# Patient Record
Sex: FEMALE | Race: WHITE | NOT HISPANIC OR LATINO | Employment: UNEMPLOYED | URBAN - METROPOLITAN AREA
[De-identification: names, ages, dates, MRNs, and addresses within clinical notes are randomized per-mention and may not be internally consistent; named-entity substitution may affect disease eponyms.]

---

## 2018-11-12 ENCOUNTER — TELEPHONE (OUTPATIENT)
Dept: NEUROSURGERY | Facility: CLINIC | Age: 68
End: 2018-11-12

## 2018-11-12 NOTE — TELEPHONE ENCOUNTER
Pt daughter calling to book second opinion appt with Dr. Melo re: 4 brain masses; seen at [place] and was originally told mother was having a stroke; after several scans, told daughter it was tumors and edema on the brain and scheduled pt for biopsy this Wednesday; pt daughter seeking second opinion before moving forward.

## 2018-11-21 ENCOUNTER — INITIAL CONSULT (OUTPATIENT)
Dept: NEUROSURGERY | Facility: CLINIC | Age: 68
End: 2018-11-21
Payer: MEDICARE

## 2018-11-21 VITALS
TEMPERATURE: 97 F | HEART RATE: 64 BPM | WEIGHT: 156.94 LBS | RESPIRATION RATE: 16 BRPM | BODY MASS INDEX: 26.79 KG/M2 | DIASTOLIC BLOOD PRESSURE: 64 MMHG | HEIGHT: 64 IN | SYSTOLIC BLOOD PRESSURE: 139 MMHG

## 2018-11-21 DIAGNOSIS — C71.9 BRAIN TUMOR, GLIOMA: Primary | ICD-10-CM

## 2018-11-21 DIAGNOSIS — R56.9 SEIZURE: ICD-10-CM

## 2018-11-21 DIAGNOSIS — G93.6 VASOGENIC BRAIN EDEMA: ICD-10-CM

## 2018-11-21 DIAGNOSIS — R47.01 APHASIA: ICD-10-CM

## 2018-11-21 PROCEDURE — 99999 PR PBB SHADOW E&M-EST. PATIENT-LVL IV: CPT | Mod: PBBFAC,,, | Performed by: NEUROLOGICAL SURGERY

## 2018-11-21 PROCEDURE — 99205 OFFICE O/P NEW HI 60 MIN: CPT | Mod: S$GLB,,, | Performed by: NEUROLOGICAL SURGERY

## 2018-11-21 PROCEDURE — 99214 OFFICE O/P EST MOD 30 MIN: CPT | Mod: PBBFAC,PN | Performed by: NEUROLOGICAL SURGERY

## 2018-11-21 RX ORDER — VIT C/E/ZN/COPPR/LUTEIN/ZEAXAN 250MG-90MG
1000 CAPSULE ORAL
COMMUNITY
End: 2019-01-18

## 2018-11-21 RX ORDER — ASPIRIN 81 MG/1
81 TABLET ORAL
COMMUNITY
End: 2019-01-18

## 2018-11-21 RX ORDER — LISINOPRIL 20 MG/1
TABLET ORAL
Refills: 0 | COMMUNITY
Start: 2018-08-24 | End: 2018-11-21 | Stop reason: SDUPTHER

## 2018-11-21 RX ORDER — ATORVASTATIN CALCIUM 10 MG/1
10 TABLET, FILM COATED ORAL DAILY
Status: ON HOLD | COMMUNITY
Start: 2018-08-24 | End: 2019-04-10 | Stop reason: HOSPADM

## 2018-11-21 RX ORDER — ATORVASTATIN CALCIUM 10 MG/1
TABLET, FILM COATED ORAL
Refills: 0 | COMMUNITY
Start: 2018-08-24 | End: 2018-11-21 | Stop reason: SDUPTHER

## 2018-11-21 RX ORDER — DICLOFENAC SODIUM 10 MG/G
4 GEL TOPICAL
COMMUNITY
Start: 2018-05-07 | End: 2018-12-06 | Stop reason: SDUPTHER

## 2018-11-21 RX ORDER — LISINOPRIL 20 MG/1
20 TABLET ORAL 2 TIMES DAILY
COMMUNITY
Start: 2018-08-24 | End: 2018-11-22

## 2018-11-21 RX ORDER — LEVETIRACETAM 500 MG/1
TABLET ORAL
Refills: 0 | Status: ON HOLD | COMMUNITY
Start: 2018-11-08 | End: 2018-11-27 | Stop reason: HOSPADM

## 2018-11-21 RX ORDER — DEXAMETHASONE 2 MG/1
TABLET ORAL
Refills: 0 | COMMUNITY
Start: 2018-11-08 | End: 2018-11-21 | Stop reason: SDUPTHER

## 2018-11-21 RX ORDER — LEVETIRACETAM 500 MG/1
500 TABLET ORAL
COMMUNITY
Start: 2018-11-08 | End: 2018-11-21

## 2018-11-21 RX ORDER — PANTOPRAZOLE SODIUM 40 MG/1
40 TABLET, DELAYED RELEASE ORAL
Status: ON HOLD | COMMUNITY
Start: 2018-11-08 | End: 2018-11-27 | Stop reason: HOSPADM

## 2018-11-21 RX ORDER — DEXAMETHASONE 2 MG/1
2 TABLET ORAL
Status: ON HOLD | COMMUNITY
Start: 2018-11-08 | End: 2018-11-27 | Stop reason: HOSPADM

## 2018-11-21 RX ORDER — PANTOPRAZOLE SODIUM 40 MG/1
TABLET, DELAYED RELEASE ORAL
Refills: 0 | COMMUNITY
Start: 2018-11-08 | End: 2018-11-21 | Stop reason: SDUPTHER

## 2018-11-21 RX ORDER — MELATONIN 10 MG
10 CAPSULE ORAL
Status: ON HOLD | COMMUNITY
End: 2019-03-07

## 2018-11-21 NOTE — PROGRESS NOTES
Neurosurgery Outpatient Follow Up    Patient ID: Ashley Spencer is a 68 y.o. female.    Chief Complaint   Patient presents with    Brain Tumor     2nd opinion from [place] per daughter; 4 brain masses on left side of brain with edema           Review of Systems   Constitutional: Negative for activity change, appetite change, chills, fever and unexpected weight change.   HENT: Negative for tinnitus, trouble swallowing and voice change.    Respiratory: Negative for apnea, cough, chest tightness and shortness of breath.    Cardiovascular: Negative for chest pain and palpitations.   Gastrointestinal: Negative for constipation, diarrhea, nausea and vomiting.   Genitourinary: Negative for difficulty urinating, dysuria, frequency and urgency.   Musculoskeletal: Positive for gait problem. Negative for back pain, neck pain and neck stiffness.   Skin: Negative for wound.   Neurological: Positive for seizures, speech difficulty, weakness and headaches. Negative for dizziness, tremors, facial asymmetry, light-headedness and numbness.   Psychiatric/Behavioral: Positive for confusion and decreased concentration. The patient is nervous/anxious.        Past Medical History:   Diagnosis Date    Anemia     Hypercholesteremia     Hypertension      Social History     Socioeconomic History    Marital status:      Spouse name: Not on file    Number of children: Not on file    Years of education: Not on file    Highest education level: Not on file   Social Needs    Financial resource strain: Not on file    Food insecurity - worry: Not on file    Food insecurity - inability: Not on file    Transportation needs - medical: Not on file    Transportation needs - non-medical: Not on file   Occupational History    Not on file   Tobacco Use    Smoking status: Never Smoker    Smokeless tobacco: Never Used   Substance and Sexual Activity    Alcohol use: No     Frequency: Never    Drug use: No    Sexual activity: Not  "Currently   Other Topics Concern    Not on file   Social History Narrative    Not on file     History reviewed. No pertinent family history.  Review of patient's allergies indicates:   Allergen Reactions    Losartan-hydrochlorothiazide Anaphylaxis and Rash     Itching and flush       Current Outpatient Medications:     atorvastatin (LIPITOR) 10 MG tablet, Take 10 mg by mouth., Disp: , Rfl:     dexamethasone (DECADRON) 2 MG tablet, Take 2 mg by mouth., Disp: , Rfl:     diclofenac sodium (VOLTAREN) 1 % Gel, Apply 4 g topically., Disp: , Rfl:     levETIRAcetam (KEPPRA) 500 MG Tab, Take 500 mg by mouth., Disp: , Rfl:     lisinopril (PRINIVIL,ZESTRIL) 20 MG tablet, Take 20 mg by mouth., Disp: , Rfl:     melatonin 10 mg Cap, Take 10 mg by mouth., Disp: , Rfl:     pantoprazole (PROTONIX) 40 MG tablet, Take 40 mg by mouth., Disp: , Rfl:     aspirin (ECOTRIN) 81 MG EC tablet, Take 81 mg by mouth., Disp: , Rfl:     atorvastatin (LIPITOR) 10 MG tablet, , Disp: , Rfl: 0    cholecalciferol, vitamin D3, (VITAMIN D3) 1,000 unit capsule, Take 1,000 Units by mouth., Disp: , Rfl:     dexamethasone (DECADRON) 2 MG tablet, TK 1 T PO Q 12 H, Disp: , Rfl: 0    levETIRAcetam (KEPPRA) 500 MG Tab, TK 1 T PO BID, Disp: , Rfl: 0    lisinopril (PRINIVIL,ZESTRIL) 20 MG tablet, TK 1 T PO BID., Disp: , Rfl: 0    pantoprazole (PROTONIX) 40 MG tablet, TK 1 T PO QD, Disp: , Rfl: 0  Blood pressure 139/64, pulse 64, temperature 97.4 °F (36.3 °C), resp. rate 16, height 5' 4" (1.626 m), weight 71.2 kg (156 lb 15.5 oz).      Neurologic Exam     Mental Status   Oriented to person, place, and time.   Speech: speech is normal     Cranial Nerves     CN III, IV, VI   Pupils are equal, round, and reactive to light.  Extraocular motions are normal.     Gait, Coordination, and Reflexes     Reflexes   Right brachioradialis: 2+  Left brachioradialis: 2+  Right biceps: 2+  Left biceps: 2+  Right triceps: 2+  Left triceps: 2+  Right patellar: " 2+  Left patellar: 2+  Right achilles: 2+  Left achilles: 2+      Physical Exam   Constitutional: She is oriented to person, place, and time. She appears well-developed and well-nourished.   HENT:   Head: Normocephalic and atraumatic.   Eyes: EOM are normal. Pupils are equal, round, and reactive to light.   Neck: Normal range of motion. Neck supple.   Cardiovascular: Normal rate and intact distal pulses.   Pulmonary/Chest: Effort normal. No respiratory distress.   Abdominal: Soft. She exhibits no distension.   Musculoskeletal: Normal range of motion. She exhibits no edema or deformity.   Neurological: She is oriented to person, place, and time. She displays abnormal reflex. She displays no atrophy and no tremor. A sensory deficit is present. No cranial nerve deficit. She exhibits abnormal muscle tone. She displays no seizure activity. Coordination and gait abnormal. She displays Babinski's sign on the right side.   Reflex Scores:       Tricep reflexes are 2+ on the right side and 2+ on the left side.       Bicep reflexes are 2+ on the right side and 2+ on the left side.       Brachioradialis reflexes are 2+ on the right side and 2+ on the left side.       Patellar reflexes are 2+ on the right side and 2+ on the left side.       Achilles reflexes are 2+ on the right side and 2+ on the left side.  Receptive aphasia  Speech delay  Impaired tandem walk  + pronator drift  + right Babinski  Impaired station and gait       Skin: Skin is warm and dry.   Psychiatric: She has a normal mood and affect. Her speech is normal and behavior is normal. Judgment and thought content normal.   Nursing note and vitals reviewed.      Provider dictation:  The patient is a 68-year-old right-handed  female recently admitted to [facility] for stroke-like symptoms and found to have a large multifocal contrast-enhancing tumor in the left frontotemporal region. Biopsy was proposed by Dr Morfin but the patient chose to postpone  this and is seeking a second opinion. She has experienced functional improvement on high dose steroids and is free of seizures on Levetiracetam. The work-up at [facility] ruled out any primary cancer in the chest, abdomen or pelvis, and an infectious etiology was also excluded.    She reports a one year history of increasing headaches, malaise, and feeling off balance. She reports a sense of hearing loss on the right side. Her family suggests her language comprehension and congnitive function have markedly declined, and she is no longer able to write.     Since discharge from [facility] she has made a partial recovery but still has receptive aphasia, and some right spatial neglect with apraxia and ataxia.     I reviewed her brain MRI together with her family members. The scan demonstrates a multifocal contrast-enhancing tumor in the angular gyrus and parietal lobule Brodmann areas 39 and 40, and a separate satellite in the superior temporal gyrus in Brodmann area 22. The vasogenic edema and enhancement pattern suggest a high grade glial neoplasm.    I have advised her that the best treatment options include maximal debulking which would require awake craniotomy with language testing and brain mapping. This would accomplish cytoreduction, reduce ICP and obtain ample tissue for molecular testing and histopathology.    We will obtain a new contrast enhanced MRI, with MRS and DTI tractography for intraoperative white matter navigation.  We have discussed post-operative WBXRT, TMZ and Optune Novocure which she can undergo at the same time with TMZ or Avastin. I have advised her to continue Levetiracetam and Dexamethasone for life. We will direct admit her Friday morning for noon surgery.    Visit Diagnosis:  Brain tumor, glioma    Aphasia    Seizure    Vasogenic brain edema

## 2018-11-21 NOTE — LETTER
November 21, 2018      [address] [place] [address]           [facility] Neurosurgery  [address] [facility] [address]  [place] [address]  Phone: [phone]  Fax: [phone]          Patient: Ashley Spencer   MR Number: [id number]   YOB: 1950   Date of Visit: 11/21/2018       Dear [facility]:    Thank you for referring Ashley Spencer to me for evaluation. Attached you will find relevant portions of my assessment and plan of care.    If you have questions, please do not hesitate to call me. I look forward to following Ashley Spencer along with you.    Sincerely,    Prasanna Melo MD    Enclosure  CC:  No Recipients    If you would like to receive this communication electronically, please contact [email or web address] or [phone] to request more information on [employer] Link access.    For providers and/or their staff who would like to refer a patient to [facility], please contact us through our one-stop-shop provider referral line, [facility], at [phone].    If you feel you have received this communication in error or would no longer like to receive these types of communications, please e-mail [email or web address]

## 2018-11-23 PROBLEM — E78.5 HYPERLIPIDEMIA: Status: ACTIVE | Noted: 2018-11-23

## 2018-11-23 PROBLEM — D49.6 BRAIN TUMOR: Status: ACTIVE | Noted: 2018-11-23

## 2018-11-23 PROBLEM — Z79.52 CURRENT USE OF STEROID MEDICATION: Status: ACTIVE | Noted: 2018-11-23

## 2018-11-23 PROBLEM — I10 BENIGN ESSENTIAL HYPERTENSION: Status: ACTIVE | Noted: 2018-11-23

## 2018-11-23 PROBLEM — G93.6 VASOGENIC EDEMA: Status: ACTIVE | Noted: 2018-11-23

## 2018-11-23 PROBLEM — R47.01 RECEPTIVE APHASIA: Status: ACTIVE | Noted: 2018-11-23

## 2018-11-23 PROBLEM — K21.9 GASTROESOPHAGEAL REFLUX DISEASE WITHOUT ESOPHAGITIS: Status: ACTIVE | Noted: 2018-11-23

## 2018-11-23 PROBLEM — C71.9 GLIOMA: Status: ACTIVE | Noted: 2018-11-23

## 2018-11-26 PROBLEM — R79.9 ELEVATED BUN: Status: ACTIVE | Noted: 2018-11-26

## 2018-11-27 ENCOUNTER — TELEPHONE (OUTPATIENT)
Dept: NEUROSURGERY | Facility: CLINIC | Age: 68
End: 2018-11-27

## 2018-11-27 DIAGNOSIS — D49.6 BRAIN TUMOR: Primary | ICD-10-CM

## 2018-11-27 NOTE — TELEPHONE ENCOUNTER
----- Message from Irma Magallanes PA-C sent at 11/27/2018 10:09 AM CST -----  Please schedule this patient a 2 week wound check and a 4 weeks post-op follow-up with Dr. Melo with repeat MRI brain w/ and w/o DTI.   Patient is currently POD #4.     Thanks,    Tiff

## 2018-11-30 DIAGNOSIS — C71.9 GBM (GLIOBLASTOMA MULTIFORME): ICD-10-CM

## 2018-12-04 ENCOUNTER — OFFICE VISIT (OUTPATIENT)
Dept: HEMATOLOGY/ONCOLOGY | Facility: CLINIC | Age: 68
End: 2018-12-04
Payer: MEDICARE

## 2018-12-04 VITALS
HEIGHT: 63 IN | TEMPERATURE: 98 F | HEART RATE: 70 BPM | WEIGHT: 150.81 LBS | RESPIRATION RATE: 18 BRPM | DIASTOLIC BLOOD PRESSURE: 65 MMHG | SYSTOLIC BLOOD PRESSURE: 111 MMHG | BODY MASS INDEX: 26.72 KG/M2

## 2018-12-04 DIAGNOSIS — C71.9 GBM (GLIOBLASTOMA MULTIFORME): Primary | ICD-10-CM

## 2018-12-04 PROCEDURE — 99205 OFFICE O/P NEW HI 60 MIN: CPT | Mod: S$GLB,,, | Performed by: INTERNAL MEDICINE

## 2018-12-04 PROCEDURE — 99999 PR PBB SHADOW E&M-EST. PATIENT-LVL III: CPT | Mod: PBBFAC,,, | Performed by: INTERNAL MEDICINE

## 2018-12-04 PROCEDURE — 1100F PTFALLS ASSESS-DOCD GE2>/YR: CPT | Mod: CPTII,S$GLB,, | Performed by: INTERNAL MEDICINE

## 2018-12-04 PROCEDURE — 3288F FALL RISK ASSESSMENT DOCD: CPT | Mod: CPTII,S$GLB,, | Performed by: INTERNAL MEDICINE

## 2018-12-04 RX ORDER — DICLOFENAC SODIUM 10 MG/G
4 GEL TOPICAL
Status: CANCELLED | OUTPATIENT
Start: 2018-12-04

## 2018-12-04 NOTE — LETTER
December 4, 2018        Prasanna Melo MD  [address] [facility] [address]  [address]  [facility] [address]             [facility] [place] [address] [facility] [address]  [facility] [address]  Phone: [phone]  Fax: [phone]   Patient: Ashley Spencer   MR Number: [id number]   YOB: 1950   Date of Visit: 12/4/2018       Dear Dr. Melo:    Thank you for referring Ashley Spencer to me for evaluation of incompletely GBM. Below are the relevant portions of my assessment and plan of care.  If you have questions, please do not hesitate to call me. I look forward to following Ashley along with you.    Sincerely,      MD PENNIE Escalante MD

## 2018-12-04 NOTE — PROGRESS NOTES
CONSULT NOTE.    CHIEF COMPLAINT:  Glioblastoma multiforme.    HISTORY OF PRESENT ILLNESS:  A 68-year-old white female who suffers from   hypertension and hyperlipidemia who began to experience aphasia and right upper   extremity weakness, which she thought was indicative of a stroke.  She was   initially seen in [facility] and then eventually transferred to   [facility].  The patient was discovered to have CNS mass lesion   on imaging and has undergone craniotomy with Dr. Melo.  Pathology was   remarkable for glioblastoma multiforme.  The tumor could not be completely   resected due to progressive neurologic deficits encountered during the   craniotomy.  The patient is approximately two weeks postop.  She has experienced   visual loss and has continued weakness of the right upper extremity.  Her   aphasia has improved somewhat.  She is eating well, but has poor oral intake of   liquids.  She spends little time up out of bed and is ambulating with the   assistance of a wheelchair at the time of today's visit.  No other complaints or   pertinent findings on a 14-point review of systems.    PAST MEDICAL HISTORY:  1.  Hypertension.  2.  Hyperlipidemia.  3.  DJD.    ALLERGIES:  Losartan/hydrochlorothiazide.    MEDICATIONS:  1.  Ecotrin 81 mg daily.  2.  Lipitor 10 mg nightly.  3.  Decadron on a taper as per Neurosurgery.  4.  Voltaren gel 4 g topically as needed.  5.  Hydrocodone 7.5/325 every 4 hours as needed for pain.  6.  Keppra 750 mg twice daily for prevention of seizures.  7.  Zestril 20 mg twice daily.  8.  Protonix 40 mg daily.  9.  Vitamin D 1000 units daily.  10.  Melatonin 10 mg every evening.    FAMILY AND SOCIAL HISTORY:  No ongoing tobacco or alcohol abuse.  The patient is   cared for by her daughter who is an Emergency Room nurse and her .    PHYSICAL EXAMINATION:  GENERAL:  Well-developed, elderly, frail-appearing, white female confined to a   wheelchair.  VITAL  SIGNS:  Weight of 150-1/2 pounds.  HEENT:  Normocephalic, post craniotomy scar, healing and free from signs of   local infection.  Oral mucosa pink and moist.  Lips without lesions.  Tongue   midline.  Oropharynx clear.  Nonicteric sclerae.   NECK:  Supple, no adenopathy.  No carotid bruits, thyromegaly or thyroid nodule.                                                                        HEART:  Regular rate and rhythm without murmur, gallop or rub.                LUNGS:  Clear to auscultation bilaterally.  Normal respiratory effort.       ABDOMEN:  Soft, nontender, nondistended with positive normoactive bowel sounds,   no hepatosplenomegaly.    EXTREMITIES:  No cyanosis, clubbing or edema.  Distal pulses are intact.                                              AXILLAE AND GROIN:  No palpable pathologic lymphadenopathy is appreciated.        SKIN:  Intact/turgor normal                                                                LABORATORY:  CBC reveals a white count of 9.7, H and H 11 and 32.8, and platelet   count of 185.  Sodium 136, potassium 4.9, chloride 106, CO2 of 23, BUN 32,   creatinine 0.7, glucose 122, calcium 8.4.  GFR is greater than 60.    IMPRESSION:  1.  Incompletely resected glioblastoma multiforme.  2.  Residual right upper extremity weakness, aphasia, and visual disturbance.  3.  Hypertension.  4.  Hyperlipidemia.    PLAN:   1.  Consult Dr. Prince in Radiation Oncology for assistance with postoperative   adjuvant XRT.  2.  Plan to administer radiosensitizing doses of Temodar during radiation.    Specifically, this will consist of Temodar at a dose of 75 mg/m2 per day (140   mg) p.o. daily 30 to 60 minutes before each radiation treatment.  3.  The patient will receive Sancuso patch each week during XRT for   prevention of therapy-associated nausea and emesis.  4.  The patient is to return to clinic at earliest convenience to initiate   combined modality therapy and will be seen by myself  one week following   initiation with interval CBC, CMP, LDH, and magnesium prior to week 2 of   treatment.  5.  Forty minutes of contact time spent counseling concerning diagnosis,   rationale for postoperative therapy, antineoplastics/supportive care meds to be   employed in therapy aspects of their administration, potential side effects,   interventions for these, etc.  The patient and family voiced understanding and   wish to proceed as above.      KINGSLEY/HN  dd: 12/04/2018 12:22:55 (CST)  td: 12/05/2018 04:48:14 (CST)  Doc ID   #[id number]  Job ID #[id number]    CC:

## 2018-12-06 ENCOUNTER — CLINICAL SUPPORT (OUTPATIENT)
Dept: NEUROSURGERY | Facility: CLINIC | Age: 68
End: 2018-12-06
Payer: MEDICARE

## 2018-12-06 ENCOUNTER — HOSPITAL ENCOUNTER (OUTPATIENT)
Dept: RADIOLOGY | Facility: HOSPITAL | Age: 68
Discharge: HOME | End: 2018-12-06
Attending: PHYSICIAN ASSISTANT
Payer: MEDICARE

## 2018-12-06 DIAGNOSIS — C71.9 GBM (GLIOBLASTOMA MULTIFORME): ICD-10-CM

## 2018-12-06 DIAGNOSIS — C71.9 GLIOMA: Primary | ICD-10-CM

## 2018-12-06 DIAGNOSIS — C71.9 GBM (GLIOBLASTOMA MULTIFORME): Primary | ICD-10-CM

## 2018-12-06 PROCEDURE — 70450 CT HEAD/BRAIN W/O DYE: CPT | Mod: 26,,, | Performed by: RADIOLOGY

## 2018-12-06 PROCEDURE — 70450 CT HEAD/BRAIN W/O DYE: CPT | Mod: TC,PO

## 2018-12-06 RX ORDER — DICLOFENAC SODIUM 10 MG/G
4 GEL TOPICAL DAILY
Qty: 100 G | Refills: 1 | Status: SHIPPED | OUTPATIENT
Start: 2018-12-06

## 2018-12-06 RX ORDER — DEXAMETHASONE 2 MG/1
2 TABLET ORAL EVERY 6 HOURS
Qty: 40 TABLET | Refills: 0 | Status: SHIPPED | OUTPATIENT
Start: 2018-12-06 | End: 2018-12-10 | Stop reason: ALTCHOICE

## 2018-12-06 NOTE — PROGRESS NOTES
Pt is 14 days s/p crani for tumor resection with Dr. Melo. No s/s of infection. Incision cleaned with chloraprep and staples removed with no issue. Incision is warm, dry, intact. Pt reports post-operative pain level < pre-operative state. Pt is not requesting medication refill today. Pt re-educated on narcotics policy. Educated patient on weight lifting status, bending/lifting/twisting, and to call with any changes or questions. Pt aware of imaging and f/u appt with provider. Pt family c/o increased right sided weakness and mild confusion; family reports some dehydration but have been trying to force fluids. TAVIA Gaona notified to assess pt and answer additional family questions.

## 2018-12-07 ENCOUNTER — TELEPHONE (OUTPATIENT)
Dept: NEUROSURGERY | Facility: CLINIC | Age: 68
End: 2018-12-07

## 2018-12-07 DIAGNOSIS — R29.818 NEUROLOGICAL DEFICIT PRESENT: ICD-10-CM

## 2018-12-07 DIAGNOSIS — D49.89 NEOPLASM OF HEAD: ICD-10-CM

## 2018-12-07 NOTE — TELEPHONE ENCOUNTER
----- Message from Candelaria Ratliff PA-C sent at 12/7/2018 12:30 PM CST -----  Please get patient in today for STAT MRI Brain-I ordered. I discussed with Dr. Melo. He wants to check for disease recurrance prior to radiation to see if she would need another resection. Her radiation appt is Monday so needs to be done today so we can decide next step.    Thank you  -K

## 2018-12-20 ENCOUNTER — HOSPITAL ENCOUNTER (OUTPATIENT)
Dept: RADIOLOGY | Facility: HOSPITAL | Age: 68
Discharge: HOME | End: 2018-12-20
Attending: NEUROLOGICAL SURGERY
Payer: MEDICARE

## 2018-12-20 ENCOUNTER — OFFICE VISIT (OUTPATIENT)
Dept: NEUROSURGERY | Facility: CLINIC | Age: 68
End: 2018-12-20
Payer: MEDICARE

## 2018-12-20 VITALS
DIASTOLIC BLOOD PRESSURE: 66 MMHG | BODY MASS INDEX: 25.61 KG/M2 | HEIGHT: 64 IN | WEIGHT: 150 LBS | SYSTOLIC BLOOD PRESSURE: 106 MMHG | TEMPERATURE: 98 F | RESPIRATION RATE: 16 BRPM | HEART RATE: 88 BPM

## 2018-12-20 DIAGNOSIS — C71.9 GLIOBLASTOMA DETERMINED BY BIOPSY OF BRAIN: Primary | ICD-10-CM

## 2018-12-20 DIAGNOSIS — D49.6 BRAIN TUMOR: ICD-10-CM

## 2018-12-20 PROCEDURE — A9585 GADOBUTROL INJECTION: HCPCS | Mod: PO | Performed by: NEUROLOGICAL SURGERY

## 2018-12-20 PROCEDURE — 70553 MRI BRAIN STEM W/O & W/DYE: CPT | Mod: TC,PO

## 2018-12-20 PROCEDURE — 25500020 PHARM REV CODE 255: Mod: PO | Performed by: NEUROLOGICAL SURGERY

## 2018-12-20 PROCEDURE — 70553 MRI BRAIN STEM W/O & W/DYE: CPT | Mod: 26,,, | Performed by: RADIOLOGY

## 2018-12-20 PROCEDURE — 99024 POSTOP FOLLOW-UP VISIT: CPT | Mod: S$GLB,,, | Performed by: NEUROLOGICAL SURGERY

## 2018-12-20 PROCEDURE — 99999 PR PBB SHADOW E&M-EST. PATIENT-LVL III: CPT | Mod: PBBFAC,,, | Performed by: NEUROLOGICAL SURGERY

## 2018-12-20 RX ORDER — GADOBUTROL 604.72 MG/ML
6 INJECTION INTRAVENOUS
Status: COMPLETED | OUTPATIENT
Start: 2018-12-20 | End: 2018-12-20

## 2018-12-20 RX ADMIN — GADOBUTROL 6 ML: 604.72 INJECTION INTRAVENOUS at 01:12

## 2018-12-21 ENCOUNTER — TELEPHONE (OUTPATIENT)
Dept: HEMATOLOGY/ONCOLOGY | Facility: CLINIC | Age: 68
End: 2018-12-21

## 2018-12-21 ENCOUNTER — TELEPHONE (OUTPATIENT)
Dept: PHARMACY | Facility: AMBULARY SURGERY CENTER | Age: 68
End: 2018-12-21

## 2018-12-21 ENCOUNTER — DOCUMENTATION ONLY (OUTPATIENT)
Dept: HEMATOLOGY/ONCOLOGY | Facility: CLINIC | Age: 68
End: 2018-12-21

## 2018-12-21 ENCOUNTER — TELEPHONE (OUTPATIENT)
Dept: PHARMACY | Facility: CLINIC | Age: 68
End: 2018-12-21

## 2018-12-21 NOTE — TELEPHONE ENCOUNTER
DOCUMENTATION ONLY:  Prior authorization for Temodar (temozolomide) approved from 12/20/2018 to 02/29/2020  Case ID: [id number]    Co-pay: $848.46    Patient Assistance IS  Required. Sending to the financial assistance team to investigate assistance options for the Temodar. Milena CRAIG

## 2018-12-21 NOTE — TELEPHONE ENCOUNTER
FOR DOCUMENTATION ONLY:  Financial Assistance for [employer] is approved from 12-21-18 to 12-31-19  Source [facility] Patient Assistance  Sending a staff message to Dr Denney

## 2018-12-21 NOTE — TELEPHONE ENCOUNTER
----- Message from Susu Moses sent at 12/21/2018  1:32 PM CST -----  Contact: Susu Moses, Pharmacy Patient Assistance  Hello,    I have been working on finding assistance for Sancuso medication for pt. Unfortunately, there isn't any funding or programs available. Is Dr. Denney able to provide additional samples or change to alternate? I tried to reach the pt. and was unsuccessful. Please advise.      Thanks,    Susu ext. [phone]

## 2018-12-21 NOTE — TELEPHONE ENCOUNTER
Temodar is approved by the patient's insurance with a high co pay. We will be assisting her in applying to [employer] patient assistance. Faxing assistance application prescription to Dr Denney for his review and signature.

## 2018-12-21 NOTE — PROGRESS NOTES
Spoke with daughter, Maria Guadalupe, patient should receive medication tomorrow, but no later than 12/24.  Spoke with Arleen in [facility] and informed her of the same.  All paperwork forwarded to Elle at [facility].

## 2018-12-30 NOTE — PROGRESS NOTES
Neurosurgery Outpatient Follow Up    Patient ID: Ashley Spencer is a 68 y.o. female.    Chief Complaint   Patient presents with    Post-op Evaluation     PO brain surgery; tumor removal           Review of Systems   Constitutional: Negative for activity change, appetite change, chills, fever and unexpected weight change.   HENT: Negative for tinnitus, trouble swallowing and voice change.    Respiratory: Negative for apnea, cough, chest tightness and shortness of breath.    Cardiovascular: Negative for chest pain and palpitations.   Gastrointestinal: Negative for constipation, diarrhea, nausea and vomiting.   Genitourinary: Negative for difficulty urinating, dysuria, frequency and urgency.   Musculoskeletal: Positive for gait problem. Negative for back pain, neck pain and neck stiffness.   Skin: Negative for wound.   Neurological: Positive for seizures, speech difficulty, weakness and headaches. Negative for dizziness, tremors, facial asymmetry, light-headedness and numbness.   Psychiatric/Behavioral: Positive for confusion and decreased concentration. The patient is nervous/anxious.        Past Medical History:   Diagnosis Date    Anemia     Cancer     Brain cancer    Heartburn     Hypercholesteremia     Hypertension     MVP (mitral valve prolapse)      Social History     Socioeconomic History    Marital status:      Spouse name: Not on file    Number of children: Not on file    Years of education: Not on file    Highest education level: Not on file   Social Needs    Financial resource strain: Not on file    Food insecurity - worry: Not on file    Food insecurity - inability: Not on file    Transportation needs - medical: Not on file    Transportation needs - non-medical: Not on file   Occupational History    Not on file   Tobacco Use    Smoking status: Never Smoker    Smokeless tobacco: Never Used   Substance and Sexual Activity    Alcohol use: No     Frequency: Never    Drug use: No     Sexual activity: Not Currently   Other Topics Concern    Not on file   Social History Narrative    Not on file     Family History   Problem Relation Age of Onset    Cancer Mother     COPD Father     Breast cancer Sister     Cancer Paternal Aunt      Review of patient's allergies indicates:   Allergen Reactions    Losartan-hydrochlorothiazide Anaphylaxis and Rash     Itching and flush       Current Outpatient Medications:     atorvastatin (LIPITOR) 10 MG tablet, Take 10 mg by mouth., Disp: , Rfl:     dexamethasone (DECADRON) 2 MG tablet, Dexamethasone Taper:Take 4mg (2 tablets) by mouth every 8 hours for 3 days, then take 4mg (2 tablets) by mouth every 12 hours for 3 days, then take 2mg ( 1 tablet) by mouth every 8 hours for 3 days, then take 2mg (1 tablet) by mouth every 12 hours indefinitely, Disp: 90 tablet, Rfl: 11    diclofenac sodium (VOLTAREN) 1 % Gel, Apply 4 g topically once daily. Apply to knee 1x daily as needed, Disp: 100 g, Rfl: 1    granisetron (SANCUSO) 3.1 mg/24 hour, Place 1 patch onto the skin on Sunday prior to each week of x-ray therapy., Disp: 5 patch, Rfl: 1    HYDROcodone-acetaminophen (NORCO) 7.5-325 mg per tablet, Take 1 tablet by mouth every 4 (four) hours as needed for Pain., Disp: 45 tablet, Rfl: 0    levETIRAcetam (KEPPRA) 750 MG Tab, Take 1 tablet (750 mg total) by mouth 2 (two) times daily., Disp: 60 tablet, Rfl: 2    lisinopril (PRINIVIL,ZESTRIL) 20 MG tablet, Take 20 mg by mouth 2 (two) times daily., Disp: , Rfl:     pantoprazole (PROTONIX) 40 MG tablet, Take 1 tablet (40 mg total) by mouth once daily., Disp: 30 tablet, Rfl: 11    temozolomide (TEMODAR) 140 MG capsule, Take 1 capsule (140 mg total) by mouth once daily during radiation., Disp: 42 capsule, Rfl: 0    aspirin (ECOTRIN) 81 MG EC tablet, Take 81 mg by mouth., Disp: , Rfl:     cholecalciferol, vitamin D3, (VITAMIN D3) 1,000 unit capsule, Take 1,000 Units by mouth., Disp: , Rfl:     melatonin 10 mg Cap,  "Take 10 mg by mouth., Disp: , Rfl:   No current facility-administered medications for this visit.     Facility-Administered Medications Ordered in Other Visits:     aluminum-magnesium hydroxide-simethicone 200-200-20 mg/5 mL suspension 30 mL, 30 mL, Oral, Q4H PRN, Candelaria Ratliff PA-C    dextrose 50% injection 12.5 g, 12.5 g, Intravenous, PRN, TAVIA Gonzalez-GABO    dextrose 50% injection 25 g, 25 g, Intravenous, PRN, TAVIA Gonzalez-GABO    glucagon (human recombinant) injection 1 mg, 1 mg, Intramuscular, PRN, Candelaria Ratliff PA-C    glucose chewable tablet 16 g, 16 g, Oral, PRN, TAVIA Gonzalez-GABO    glucose chewable tablet 24 g, 24 g, Oral, PRN, Candelaria Ratliff PA-C    hydrALAZINE injection 20 mg, 20 mg, Intravenous, Q2H PRN, Candelaria Ratliff PA-C    insulin aspart U-100 pen 1-10 Units, 1-10 Units, Subcutaneous, Q6H PRN, TAVIA Gonzalez-C, 2 Units at 11/26/18 1821    labetalol 20 mg/4 mL (5 mg/mL) IV syring, 10 mg, Intravenous, Q1H PRN, Candelaria Ratliff PA-C, 15 mg at 11/23/18 1600    levETIRAcetam (KEPPRA) 750 mg in dextrose 5 % 100 mL IVPB, 750 mg, Intravenous, Q12H, Candelaria Ratliff PA-C, Last Rate: 200 mL/hr at 11/27/18 0830, 750 mg at 11/27/18 0830    ondansetron injection 4 mg, 4 mg, Intravenous, Q6H PRN, Candelaria Ratliff PA-C    pantoprazole injection 40 mg, 40 mg, Intravenous, Daily, Candelaria Ratliff PA-C, 40 mg at 11/27/18 0829    senna-docusate 8.6-50 mg per tablet 2 tablet, 2 tablet, Oral, Nightly PRN, Candelaria Ratliff PA-C, 2 tablet at 11/25/18 1651  Blood pressure 106/66, pulse 88, temperature 97.8 °F (36.6 °C), resp. rate 16, height 5' 4" (1.626 m), weight 68.1 kg (150 lb 0.4 oz).      Neurologic Exam     Mental Status   Oriented to person, place, and time.   Speech: speech is normal     Cranial Nerves     CN III, IV, VI   Pupils are equal, round, and reactive to light.  Extraocular motions are normal. "     Gait, Coordination, and Reflexes     Reflexes   Right brachioradialis: 2+  Left brachioradialis: 2+  Right biceps: 2+  Left biceps: 2+  Right triceps: 2+  Left triceps: 2+  Right patellar: 2+  Left patellar: 2+  Right achilles: 2+  Left achilles: 2+      Physical Exam   Constitutional: She is oriented to person, place, and time. She appears well-developed and well-nourished.   HENT:   Head: Normocephalic and atraumatic.   Eyes: EOM are normal. Pupils are equal, round, and reactive to light.   Neck: Normal range of motion. Neck supple.   Cardiovascular: Normal rate and intact distal pulses.   Pulmonary/Chest: Effort normal. No respiratory distress.   Abdominal: Soft. She exhibits no distension.   Musculoskeletal: Normal range of motion. She exhibits no edema or deformity.   Neurological: She is oriented to person, place, and time. She displays abnormal reflex. She displays no atrophy and no tremor. A sensory deficit is present. No cranial nerve deficit. She exhibits abnormal muscle tone. She displays no seizure activity. Coordination and gait abnormal. She displays Babinski's sign on the right side.   Reflex Scores:       Tricep reflexes are 2+ on the right side and 2+ on the left side.       Bicep reflexes are 2+ on the right side and 2+ on the left side.       Brachioradialis reflexes are 2+ on the right side and 2+ on the left side.       Patellar reflexes are 2+ on the right side and 2+ on the left side.       Achilles reflexes are 2+ on the right side and 2+ on the left side.  Receptive aphasia  Speech delay  Impaired tandem walk  + pronator drift  + right Babinski  Impaired station and gait       Skin: Skin is warm and dry.   Psychiatric: She has a normal mood and affect. Her speech is normal and behavior is normal. Judgment and thought content normal.   Nursing note and vitals reviewed.      Provider dictation:  The patient is a 68-year-old right-handed  female 4 weeks s/p partial resection of a  large multifocal contrast-enhancing tumor in the left frontotemporal region. 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  She has experienced functional improvement on high dose steroids and is free of seizures on Levetiracetam.      Her family suggests her language comprehension and congnitive function have stablized and she is abnle to communicate fairly well.     On exam she has some right spatial neglect with apraxia and ataxia. She has new visual loss from last exam.    I reviewed her new brain MRI together with her family members. The scan demonstrates appearance of new tumors in the left tapetum, parietoocipital junction. There is contrast-enhancement in the resection  Beds and reduced edema at the site of the previous lesions.     I have advised her that we have achieved some cytoreduction, and edema reduction thus decreasing ICP but the aggressive de dalia formation of a new tumor in just 3 weeks is very concerning.    We will obtain a new contrast enhanced MRI, with MRS and DTI tractography in 3 months.  We have advised her to see oncology soon and she is cleared to pursue post-operative WBXRT, TMZ and Optune Novocure which she can undergo at the same time with TMZ or Avastin. I have advised her to continue Levetiracetam and low dose Dexamethasone for life.     Visit Diagnosis:  Glioblastoma determined by biopsy of brain

## 2019-01-09 ENCOUNTER — LAB VISIT (OUTPATIENT)
Dept: LAB | Facility: HOSPITAL | Age: 69
End: 2019-01-09
Attending: INTERNAL MEDICINE
Payer: MEDICARE

## 2019-01-09 ENCOUNTER — OFFICE VISIT (OUTPATIENT)
Dept: HEMATOLOGY/ONCOLOGY | Facility: CLINIC | Age: 69
End: 2019-01-09
Payer: MEDICARE

## 2019-01-09 VITALS
BODY MASS INDEX: 21.83 KG/M2 | WEIGHT: 135.81 LBS | HEIGHT: 66 IN | SYSTOLIC BLOOD PRESSURE: 114 MMHG | RESPIRATION RATE: 16 BRPM | HEART RATE: 93 BPM | TEMPERATURE: 99 F | DIASTOLIC BLOOD PRESSURE: 80 MMHG

## 2019-01-09 DIAGNOSIS — D64.9 NORMOCYTIC ANEMIA: ICD-10-CM

## 2019-01-09 DIAGNOSIS — T45.1X5A CINV (CHEMOTHERAPY-INDUCED NAUSEA AND VOMITING): ICD-10-CM

## 2019-01-09 DIAGNOSIS — C71.9 GBM (GLIOBLASTOMA MULTIFORME): Primary | ICD-10-CM

## 2019-01-09 DIAGNOSIS — C71.9 GBM (GLIOBLASTOMA MULTIFORME): ICD-10-CM

## 2019-01-09 DIAGNOSIS — R11.2 CINV (CHEMOTHERAPY-INDUCED NAUSEA AND VOMITING): ICD-10-CM

## 2019-01-09 LAB
ALBUMIN SERPL BCP-MCNC: 3.9 G/DL
ALP SERPL-CCNC: 54 U/L
ALT SERPL W/O P-5'-P-CCNC: 57 U/L
ANION GAP SERPL CALC-SCNC: 10 MMOL/L
AST SERPL-CCNC: 30 U/L
BASOPHILS NFR BLD: 0 %
BILIRUB SERPL-MCNC: 0.7 MG/DL
BUN SERPL-MCNC: 29 MG/DL
CALCIUM SERPL-MCNC: 9.1 MG/DL
CHLORIDE SERPL-SCNC: 105 MMOL/L
CO2 SERPL-SCNC: 24 MMOL/L
CREAT SERPL-MCNC: 0.74 MG/DL
DIFFERENTIAL METHOD: ABNORMAL
EOSINOPHIL NFR BLD: 0 %
ERYTHROCYTE [DISTWIDTH] IN BLOOD BY AUTOMATED COUNT: 15.5 %
EST. GFR  (AFRICAN AMERICAN): >60 ML/MIN/1.73 M^2
EST. GFR  (NON AFRICAN AMERICAN): >60 ML/MIN/1.73 M^2
GIANT PLATELETS BLD QL SMEAR: ABNORMAL
GLUCOSE SERPL-MCNC: 159 MG/DL
HCT VFR BLD AUTO: 36.6 %
HGB BLD-MCNC: 11.9 G/DL
LDH SERPL L TO P-CCNC: 932 U/L
LYMPHOCYTES NFR BLD: 12 %
MAGNESIUM SERPL-MCNC: 2 MG/DL
MCH RBC QN AUTO: 31.2 PG
MCHC RBC AUTO-ENTMCNC: 32.5 G/DL
MCV RBC AUTO: 96 FL
MONOCYTES NFR BLD: 6 %
MYELOCYTES NFR BLD MANUAL: 2 %
NEUTROPHILS # BLD AUTO: 5.7 K/UL
NEUTROPHILS NFR BLD: 78 %
NEUTS BAND NFR BLD MANUAL: 2 %
NRBC BLD-RTO: 0 /100 WBC
PLATELET # BLD AUTO: 137 K/UL
PMV BLD AUTO: 9.9 FL
POTASSIUM SERPL-SCNC: 4.6 MMOL/L
PROT SERPL-MCNC: 6.9 G/DL
RBC # BLD AUTO: 3.82 M/UL
SODIUM SERPL-SCNC: 139 MMOL/L
WBC # BLD AUTO: 7.11 K/UL

## 2019-01-09 PROCEDURE — 85027 COMPLETE CBC AUTOMATED: CPT

## 2019-01-09 PROCEDURE — 99499 RISK ADDL DX/OHS AUDIT: ICD-10-PCS | Mod: S$GLB,,, | Performed by: INTERNAL MEDICINE

## 2019-01-09 PROCEDURE — 83735 ASSAY OF MAGNESIUM: CPT | Mod: PN

## 2019-01-09 PROCEDURE — 85027 COMPLETE CBC AUTOMATED: CPT | Mod: PN

## 2019-01-09 PROCEDURE — 1101F PT FALLS ASSESS-DOCD LE1/YR: CPT | Mod: CPTII,S$GLB,, | Performed by: INTERNAL MEDICINE

## 2019-01-09 PROCEDURE — 85007 BL SMEAR W/DIFF WBC COUNT: CPT | Mod: PN

## 2019-01-09 PROCEDURE — 1101F PR PT FALLS ASSESS DOC 0-1 FALLS W/OUT INJ PAST YR: ICD-10-PCS | Mod: CPTII,S$GLB,, | Performed by: INTERNAL MEDICINE

## 2019-01-09 PROCEDURE — 99499 UNLISTED E&M SERVICE: CPT | Mod: S$GLB,,, | Performed by: INTERNAL MEDICINE

## 2019-01-09 PROCEDURE — 36415 COLL VENOUS BLD VENIPUNCTURE: CPT | Mod: PN

## 2019-01-09 PROCEDURE — 99999 PR PBB SHADOW E&M-EST. PATIENT-LVL III: ICD-10-PCS | Mod: PBBFAC,,, | Performed by: INTERNAL MEDICINE

## 2019-01-09 PROCEDURE — 80053 COMPREHEN METABOLIC PANEL: CPT | Mod: PN

## 2019-01-09 PROCEDURE — 83615 LACTATE (LD) (LDH) ENZYME: CPT | Mod: PN

## 2019-01-09 PROCEDURE — 99999 PR PBB SHADOW E&M-EST. PATIENT-LVL III: CPT | Mod: PBBFAC,,, | Performed by: INTERNAL MEDICINE

## 2019-01-09 PROCEDURE — 80053 COMPREHEN METABOLIC PANEL: CPT

## 2019-01-09 PROCEDURE — 99214 OFFICE O/P EST MOD 30 MIN: CPT | Mod: S$GLB,,, | Performed by: INTERNAL MEDICINE

## 2019-01-09 PROCEDURE — 83615 LACTATE (LD) (LDH) ENZYME: CPT

## 2019-01-09 PROCEDURE — 85007 BL SMEAR W/DIFF WBC COUNT: CPT

## 2019-01-09 PROCEDURE — 99214 PR OFFICE/OUTPT VISIT, EST, LEVL IV, 30-39 MIN: ICD-10-PCS | Mod: S$GLB,,, | Performed by: INTERNAL MEDICINE

## 2019-01-09 PROCEDURE — 83735 ASSAY OF MAGNESIUM: CPT

## 2019-01-09 RX ORDER — PROCHLORPERAZINE MALEATE 10 MG
10 TABLET ORAL EVERY 6 HOURS PRN
Qty: 60 TABLET | Refills: 6 | Status: SHIPPED | OUTPATIENT
Start: 2019-01-09 | End: 2020-01-09

## 2019-01-09 NOTE — PROGRESS NOTES
HISTORY OF PRESENT ILLNESS:  The patient is a 68-year-old white female known to   me for incompletely resected glioblastoma multiforme.  She is receiving external   beam radiation therapy and radiosensitizing doses of Temodar.  She returns to   clinic for evaluation prior to next week's therapy as I will be out on Friday.    She has had one episode of CINV.  This occurred despite Sancuso patch.  She did   not have other medication on hand to quell nausea.  The patient is generally   weak, sleeps much of the day, and continues to have left lower extremity and   right upper extremity weakness and visual field defect.  No other complaints or   pertinent findings on a 14-point review of systems.    PHYSICAL EXAMINATION:  GENERAL:  Well-developed, obese white female in no acute distress.  VITAL SIGNS:  Weight 135.5 pounds (decreased by 15 pounds).  HEENT:  Normocephalic, atraumatic.  Oral mucosa pink and moist.  Lips without   lesions.  Tongue midline.  Oropharynx clear.  Nonicteric sclerae.   NECK:  Supple, no adenopathy.  No carotid bruits, thyromegaly or thyroid nodule.                                                                        HEART:  Regular rate and rhythm without murmur, gallop or rub.                LUNGS:  Clear to auscultation bilaterally.  Normal respiratory effort.       ABDOMEN:  Soft, nontender, nondistended with positive normoactive bowel sounds,   no hepatosplenomegaly.    EXTREMITIES:  No cyanosis, clubbing or edema.  Distal pulses are intact.                                              AXILLAE AND GROIN:  No palpable pathologic lymphadenopathy is appreciated.        SKIN:  Intact/turgor normal                                                              NEUROLOGIC:  The patient's right upper extremity remains in a sling and she   continues to ambulate with the assistance of a wheelchair.    LABORATORY:  White count 7.1, H and H 11.9 and 36.6, platelets 137.  Sodium 139,   potassium 4.6,  chloride 105, CO2 24, BUN 29, creatinine 0.7, glucose 159,   calcium 9.1, ALT 57.  Remainder of the liver function tests within normal   limits.  .  GFR is greater than 60.    IMPRESSION:  1.  Incompletely resected glioblastoma multiforme.  2.  Residual right upper extremity weakness/visual disturbance, gait impairment.  3.  Hypertension.  4.  Hyperlipidemia.  5.  Weight loss.    PLAN:  1.  Continue XRT per Dr. Prince.  2.  The patient is cleared to proceed with next week's Temodar.  3.  The patient has been provided prescription for Compazine for as needed use   if she has breakthrough nausea and emesis.  Otherwise, she is to continue   Sancuso as previously prescribed.  4.  Return to clinic one week from Friday with interval CBC, BMP, and magnesium.      KINGSLEY/HN  dd: 01/09/2019 10:33:49 (CST)  td: 01/10/2019 06:22:45 (CST)  Doc ID   #[id number]  Job ID #[id number]    CC:

## 2019-01-10 NOTE — PROGRESS NOTES
Patient, Ashley Spencer (MRN #[id number]), presented with a recent Platelet count less than 150 K/uL consistent with the definition of thrombocytopenia (ICD10 - D69.6).    Platelets   Date Value Ref Range Status   01/09/2019 137 (L) 150 - 350 K/uL Final     The patient's thrombocytopenia was monitored, evaluated, addressed and/or treated. This addendum to the medical record is made on 01/09/2019.

## 2019-01-11 ENCOUNTER — DOCUMENTATION ONLY (OUTPATIENT)
Dept: HEMATOLOGY/ONCOLOGY | Facility: CLINIC | Age: 69
End: 2019-01-11

## 2019-01-11 DIAGNOSIS — Z78.9 POOR TOLERANCE FOR AMBULATION: ICD-10-CM

## 2019-01-11 DIAGNOSIS — R47.01 RECEPTIVE APHASIA: ICD-10-CM

## 2019-01-11 DIAGNOSIS — C71.9 GBM (GLIOBLASTOMA MULTIFORME): Primary | ICD-10-CM

## 2019-01-11 NOTE — PROGRESS NOTES
Medical Necessity form completed and faxed with notes, orders and insurance verification to [facility]. For [facility]SC and WC

## 2019-01-18 ENCOUNTER — HOSPITAL ENCOUNTER (OUTPATIENT)
Dept: RADIOLOGY | Facility: HOSPITAL | Age: 69
Discharge: HOME | End: 2019-01-18
Attending: INTERNAL MEDICINE
Payer: MEDICARE

## 2019-01-18 ENCOUNTER — OFFICE VISIT (OUTPATIENT)
Dept: HEMATOLOGY/ONCOLOGY | Facility: CLINIC | Age: 69
End: 2019-01-18
Payer: MEDICARE

## 2019-01-18 VITALS
HEART RATE: 96 BPM | DIASTOLIC BLOOD PRESSURE: 60 MMHG | RESPIRATION RATE: 18 BRPM | HEIGHT: 66 IN | BODY MASS INDEX: 24.91 KG/M2 | TEMPERATURE: 98 F | WEIGHT: 155 LBS | SYSTOLIC BLOOD PRESSURE: 112 MMHG

## 2019-01-18 DIAGNOSIS — M79.671 FOOT PAIN, RIGHT: ICD-10-CM

## 2019-01-18 DIAGNOSIS — T45.1X5A CINV (CHEMOTHERAPY-INDUCED NAUSEA AND VOMITING): ICD-10-CM

## 2019-01-18 DIAGNOSIS — R11.2 CINV (CHEMOTHERAPY-INDUCED NAUSEA AND VOMITING): ICD-10-CM

## 2019-01-18 DIAGNOSIS — C71.9 GBM (GLIOBLASTOMA MULTIFORME): Primary | ICD-10-CM

## 2019-01-18 DIAGNOSIS — M79.89 LOCALIZED SWELLING OF LOWER EXTREMITY: ICD-10-CM

## 2019-01-18 PROCEDURE — 99215 PR OFFICE/OUTPT VISIT, EST, LEVL V, 40-54 MIN: ICD-10-PCS | Mod: S$GLB,,, | Performed by: INTERNAL MEDICINE

## 2019-01-18 PROCEDURE — 3288F FALL RISK ASSESSMENT DOCD: CPT | Mod: CPTII,S$GLB,, | Performed by: INTERNAL MEDICINE

## 2019-01-18 PROCEDURE — 1100F PTFALLS ASSESS-DOCD GE2>/YR: CPT | Mod: CPTII,S$GLB,, | Performed by: INTERNAL MEDICINE

## 2019-01-18 PROCEDURE — 99215 OFFICE O/P EST HI 40 MIN: CPT | Mod: S$GLB,,, | Performed by: INTERNAL MEDICINE

## 2019-01-18 PROCEDURE — 73630 XR FOOT COMPLETE 3 VIEW RIGHT: ICD-10-PCS | Mod: 26,RT,, | Performed by: RADIOLOGY

## 2019-01-18 PROCEDURE — 93971 US LOWER EXTREMITY VEINS RIGHT: ICD-10-PCS | Mod: 26,RT,, | Performed by: RADIOLOGY

## 2019-01-18 PROCEDURE — 93971 EXTREMITY STUDY: CPT | Mod: TC,PO,RT

## 2019-01-18 PROCEDURE — 73630 X-RAY EXAM OF FOOT: CPT | Mod: 26,RT,, | Performed by: RADIOLOGY

## 2019-01-18 PROCEDURE — 3288F PR FALLS RISK ASSESSMENT DOCUMENTED: ICD-10-PCS | Mod: CPTII,S$GLB,, | Performed by: INTERNAL MEDICINE

## 2019-01-18 PROCEDURE — 73630 X-RAY EXAM OF FOOT: CPT | Mod: TC,FY,PO,RT

## 2019-01-18 PROCEDURE — 99999 PR PBB SHADOW E&M-EST. PATIENT-LVL IV: CPT | Mod: PBBFAC,,, | Performed by: INTERNAL MEDICINE

## 2019-01-18 PROCEDURE — 1100F PR PT FALLS ASSESS DOC 2+ FALLS/FALL W/INJURY/YR: ICD-10-PCS | Mod: CPTII,S$GLB,, | Performed by: INTERNAL MEDICINE

## 2019-01-18 PROCEDURE — 93971 EXTREMITY STUDY: CPT | Mod: 26,RT,, | Performed by: RADIOLOGY

## 2019-01-18 PROCEDURE — 99999 PR PBB SHADOW E&M-EST. PATIENT-LVL IV: ICD-10-PCS | Mod: PBBFAC,,, | Performed by: INTERNAL MEDICINE

## 2019-01-18 NOTE — PROGRESS NOTES
HISTORY OF PRESENT ILLNESS:  The patient is a 68-year-old white female known to   me for incompletely resected glioblastoma multiforme.  The patient is receiving   XRT/radiosensitizing doses of Temodar and presents for reevaluation prior to   next week's therapy.  She has had some difficulty with chemotherapy-associated   nausea.  She has not experienced emesis during the last week.  The patient   sustained an injury to her right foot while showering.  Her daughter was   attempting to assist her in the shower and stepped on the foot.    REVIEW OF SYSTEMS:  Also remarkable for generalized swelling of the right lower   extremity and increasing abdominal distention.  The patient remains on Decadron.    PHYSICAL EXAMINATION:  GENERAL:  The patient is a well-developed, obese, white female confined to a   wheelchair, who has a weight of 155 pounds.  VITAL SIGNS:  Documented in EMR and reviewed.  HEENT:  Normocephalic, atraumatic.  Oral mucosa pink and moist.  Lips without   lesions.  Tongue midline.  Oropharynx clear.  Nonicteric sclerae.   NECK:  Supple, no adenopathy.  No carotid bruits, thyromegaly or thyroid nodule.  HEART:  Regular rate and rhythm without murmur, gallop or rub.                LUNGS:  Clear to auscultation bilaterally.  Normal respiratory effort.       ABDOMEN:  Soft, nontender, nondistended with positive normoactive bowel sounds,   no hepatosplenomegaly.  AXILLAE AND GROIN:  No palpable pathologic lymphadenopathy is appreciated.        SKIN:  Intact/turgor normal.  EXTREMITIES:  The patient's right upper extremity remains immobilized in a sling   as the patient is unable to move at her support weight comfortably.  The   patient has generalized right lower extremity edema, greater over the dorsum of   the foot.  Distal pulses are intact.  NEUROLOGIC:  The patient appears generally debilitated, is confined to a   wheelchair for purposes of ambulation.    LABORATORY:  White count 6.9, H and H 10.5 and  33, platelets of 124.  ANC is   6200.  Chemistries:  Sodium 140, potassium 4.8, chloride 105, CO2 30, BUN 23,   creatinine 0.7, glucose 98, calcium 9, magnesium 2.1.  GFR greater than 60.    IMPRESSION:  1.  Incompletely resected glioblastoma multiforme.  2.  Residual right upper extremity weakness/visual disturbance/gait impairment.  3.  Increasing abdominal distention likely due to chronic use of steroids.  4.  Injury to right foot with resultant pain and swelling.  5.  Generalized swelling to the right lower extremity as compared to the left --   possibly due to corticosteroids.  However, in light of her immobile state and   lack of edema on the contralateral side, I feel it is important to rule out   thrombotic event.  6.  Chemotherapy-associated nausea and emesis relatively well palliated through   use of Sancuso and p.r.n. Compazine.    PLAN:  1.  The patient is cleared to proceed with next week's XRT with radiosensitizing   doses of Temodar 140 mg daily 30 to 60 minutes before radiation.  2.  Continue Sancuso/Compazine for control of emesis.  3.  Defer steroid taper to Radiation Oncology.  4.  Obtain plain x-rays of the right foot in order to rule out fracture.  5.  Obtain right lower extremity venous Doppler in order to rule out DVT.  6.  Otherwise, return in one week with interval CBC, CMP, LDH, and mag.      KINGSLEY/HN  dd: 01/18/2019 12:19:12 (CST)  td: 01/19/2019 00:46:42 (CST)  Doc ID   #[id number]  Job ID #[id number]    CC:

## 2019-01-25 ENCOUNTER — TELEPHONE (OUTPATIENT)
Dept: HEMATOLOGY/ONCOLOGY | Facility: CLINIC | Age: 69
End: 2019-01-25

## 2019-01-25 ENCOUNTER — LAB VISIT (OUTPATIENT)
Dept: LAB | Facility: HOSPITAL | Age: 69
End: 2019-01-25
Attending: INTERNAL MEDICINE
Payer: MEDICARE

## 2019-01-25 ENCOUNTER — OFFICE VISIT (OUTPATIENT)
Dept: HEMATOLOGY/ONCOLOGY | Facility: CLINIC | Age: 69
End: 2019-01-25
Payer: MEDICARE

## 2019-01-25 VITALS
DIASTOLIC BLOOD PRESSURE: 74 MMHG | RESPIRATION RATE: 20 BRPM | HEIGHT: 66 IN | WEIGHT: 148.56 LBS | TEMPERATURE: 98 F | BODY MASS INDEX: 23.88 KG/M2 | HEART RATE: 94 BPM | SYSTOLIC BLOOD PRESSURE: 126 MMHG

## 2019-01-25 DIAGNOSIS — D64.81 ANTINEOPLASTIC CHEMOTHERAPY INDUCED ANEMIA: ICD-10-CM

## 2019-01-25 DIAGNOSIS — T45.1X5A CHEMOTHERAPY-INDUCED THROMBOCYTOPENIA: ICD-10-CM

## 2019-01-25 DIAGNOSIS — C71.9 GBM (GLIOBLASTOMA MULTIFORME): Primary | ICD-10-CM

## 2019-01-25 DIAGNOSIS — C71.9 GBM (GLIOBLASTOMA MULTIFORME): ICD-10-CM

## 2019-01-25 DIAGNOSIS — R11.2 CINV (CHEMOTHERAPY-INDUCED NAUSEA AND VOMITING): ICD-10-CM

## 2019-01-25 DIAGNOSIS — B35.1 TOENAIL FUNGUS: Primary | ICD-10-CM

## 2019-01-25 DIAGNOSIS — T45.1X5A CINV (CHEMOTHERAPY-INDUCED NAUSEA AND VOMITING): ICD-10-CM

## 2019-01-25 DIAGNOSIS — T45.1X5A ANTINEOPLASTIC CHEMOTHERAPY INDUCED ANEMIA: ICD-10-CM

## 2019-01-25 DIAGNOSIS — D69.59 CHEMOTHERAPY-INDUCED THROMBOCYTOPENIA: ICD-10-CM

## 2019-01-25 DIAGNOSIS — K64.4 INFLAMED EXTERNAL HEMORRHOID: ICD-10-CM

## 2019-01-25 DIAGNOSIS — L60.0 INGROWING NAIL, RIGHT GREAT TOE: ICD-10-CM

## 2019-01-25 LAB
ALBUMIN SERPL BCP-MCNC: 3.8 G/DL
ALP SERPL-CCNC: 58 U/L
ALT SERPL W/O P-5'-P-CCNC: 49 U/L
ANION GAP SERPL CALC-SCNC: 6 MMOL/L
ANISOCYTOSIS BLD QL SMEAR: SLIGHT
AST SERPL-CCNC: 29 U/L
BASOPHILS NFR BLD: 0 %
BILIRUB SERPL-MCNC: 0.8 MG/DL
BUN SERPL-MCNC: 20 MG/DL
CALCIUM SERPL-MCNC: 9.1 MG/DL
CHLORIDE SERPL-SCNC: 103 MMOL/L
CO2 SERPL-SCNC: 29 MMOL/L
CREAT SERPL-MCNC: 0.7 MG/DL
DACRYOCYTES BLD QL SMEAR: ABNORMAL
DIFFERENTIAL METHOD: ABNORMAL
EOSINOPHIL NFR BLD: 0 %
ERYTHROCYTE [DISTWIDTH] IN BLOOD BY AUTOMATED COUNT: 18 %
EST. GFR  (AFRICAN AMERICAN): >60 ML/MIN/1.73 M^2
EST. GFR  (NON AFRICAN AMERICAN): >60 ML/MIN/1.73 M^2
GLUCOSE SERPL-MCNC: 107 MG/DL
HCT VFR BLD AUTO: 34.4 %
HGB BLD-MCNC: 11.4 G/DL
LDH SERPL L TO P-CCNC: 1017 U/L
LYMPHOCYTES NFR BLD: 5 %
MAGNESIUM SERPL-MCNC: 2.2 MG/DL
MCH RBC QN AUTO: 32.5 PG
MCHC RBC AUTO-ENTMCNC: 33.1 G/DL
MCV RBC AUTO: 98 FL
MONOCYTES NFR BLD: 5 %
NEUTROPHILS # BLD AUTO: 4.6 K/UL
NEUTROPHILS NFR BLD: 90 %
NRBC BLD-RTO: 1 /100 WBC
PLATELET # BLD AUTO: 110 K/UL
PLATELET BLD QL SMEAR: ABNORMAL
PMV BLD AUTO: 9.8 FL
POIKILOCYTOSIS BLD QL SMEAR: SLIGHT
POLYCHROMASIA BLD QL SMEAR: ABNORMAL
POTASSIUM SERPL-SCNC: 4.1 MMOL/L
PROT SERPL-MCNC: 6.8 G/DL
RBC # BLD AUTO: 3.51 M/UL
SODIUM SERPL-SCNC: 138 MMOL/L
WBC # BLD AUTO: 5.14 K/UL

## 2019-01-25 PROCEDURE — 85027 COMPLETE CBC AUTOMATED: CPT

## 2019-01-25 PROCEDURE — 80053 COMPREHEN METABOLIC PANEL: CPT

## 2019-01-25 PROCEDURE — 85007 BL SMEAR W/DIFF WBC COUNT: CPT | Mod: PN

## 2019-01-25 PROCEDURE — 1100F PR PT FALLS ASSESS DOC 2+ FALLS/FALL W/INJURY/YR: ICD-10-PCS | Mod: CPTII,S$GLB,, | Performed by: INTERNAL MEDICINE

## 2019-01-25 PROCEDURE — 36415 COLL VENOUS BLD VENIPUNCTURE: CPT | Mod: PN

## 2019-01-25 PROCEDURE — 1100F PTFALLS ASSESS-DOCD GE2>/YR: CPT | Mod: CPTII,S$GLB,, | Performed by: INTERNAL MEDICINE

## 2019-01-25 PROCEDURE — 83615 LACTATE (LD) (LDH) ENZYME: CPT

## 2019-01-25 PROCEDURE — 83615 LACTATE (LD) (LDH) ENZYME: CPT | Mod: PN

## 2019-01-25 PROCEDURE — 3288F FALL RISK ASSESSMENT DOCD: CPT | Mod: CPTII,S$GLB,, | Performed by: INTERNAL MEDICINE

## 2019-01-25 PROCEDURE — 99999 PR PBB SHADOW E&M-EST. PATIENT-LVL IV: ICD-10-PCS | Mod: PBBFAC,,, | Performed by: INTERNAL MEDICINE

## 2019-01-25 PROCEDURE — 83735 ASSAY OF MAGNESIUM: CPT

## 2019-01-25 PROCEDURE — 80053 COMPREHEN METABOLIC PANEL: CPT | Mod: PN

## 2019-01-25 PROCEDURE — 99215 OFFICE O/P EST HI 40 MIN: CPT | Mod: S$GLB,,, | Performed by: INTERNAL MEDICINE

## 2019-01-25 PROCEDURE — 3288F PR FALLS RISK ASSESSMENT DOCUMENTED: ICD-10-PCS | Mod: CPTII,S$GLB,, | Performed by: INTERNAL MEDICINE

## 2019-01-25 PROCEDURE — 85027 COMPLETE CBC AUTOMATED: CPT | Mod: PN

## 2019-01-25 PROCEDURE — 99999 PR PBB SHADOW E&M-EST. PATIENT-LVL IV: CPT | Mod: PBBFAC,,, | Performed by: INTERNAL MEDICINE

## 2019-01-25 PROCEDURE — 99215 PR OFFICE/OUTPT VISIT, EST, LEVL V, 40-54 MIN: ICD-10-PCS | Mod: S$GLB,,, | Performed by: INTERNAL MEDICINE

## 2019-01-25 PROCEDURE — 85007 BL SMEAR W/DIFF WBC COUNT: CPT

## 2019-01-25 PROCEDURE — 83735 ASSAY OF MAGNESIUM: CPT | Mod: PN

## 2019-01-25 NOTE — PROGRESS NOTES
HISTORY OF PRESENT ILLNESS:  The patient is a 68-year-old white female well   known to me for incompletely resected glioblastoma multiforme who is receiving   XRT/radiosensitizing doses of Temodar and returns to clinic for evaluation prior   to next week's therapy.  She has developed an inflamed hemorrhoid since last   office visit.  Difficulty she was having with pain and swelling of her foot   appears to have diminished.  However, the nail on her right great toe has become   ingrown and she appears to be suffering from nail fungus, which has become   painful.  No other complaints or pertinent findings on a 14-point review of   system.    PHYSICAL EXAMINATION:  GENERAL:  Well-developed, elderly, frail-appearing, white female who is confined   to a wheelchair.  VITAL SIGNS:  Weight of 148-1/2 pounds (decreased by 6-1/2 pounds).  HEENT:  Normocephalic, atraumatic.  Oral mucosa pink and moist.  Lips without   lesions.  Tongue midline.  Oropharynx clear.  Nonicteric sclerae.   NECK:  Supple, no adenopathy.  No carotid bruits, thyromegaly or thyroid nodule.                                                                        HEART:  Regular rate and rhythm without murmur, gallop or rub.                LUNGS:  Clear to auscultation bilaterally.  Normal respiratory effort.       ABDOMEN:  Soft, nontender, nondistended with positive normoactive bowel sounds,   no hepatosplenomegaly.    EXTREMITIES:  No cyanosis, clubbing or edema.  Distal pulses are intact.      Right upper extremity remains immobilized in a sling.                                        AXILLAE AND GROIN:  No palpable pathologic lymphadenopathy is appreciated.        SKIN:  Intact/turgor normal.  The patient appears to have extensive   onychomycosis of the right great toenail, which has begun and taken to the skin   on either side of the nail.  This does not appear secondarily infected.                                                              NEUROLOGIC:  Cranial nerves II-XII grossly intact.  Motor:  Good muscle bulk and   tone.  Strength/sensory 5/5 throughout.  Gait stable.     LABORATORY:  White count 5.1, H and H 11.4 and 34.4, platelets of 110.  Sodium   138, potassium 4.1, chloride 103, CO2 of 29, BUN 20, creatinine 0.7, glucose   107, calcium 9.1, mag 2.2.  Liver function test remarkable for an ALT of 49.    LDH is 1017.  GFR is greater than 60.    IMPRESSION:  1.  Incompletely resected glioblastoma multiforme.  2.  Right upper extremity weakness/visual disturbance/gait impairment - stable.  3.  Onychomycosis, right great toenail.  4.  Ingrown right great toenail.  5.  Chemotherapy-associated anemia.  6.  Chemotherapy-associated thrombocytopenia.  7.  Inflamed hemorrhoids.    PLAN:  1.  Proceed with next week's XRT with radiosensitizing doses of Temodar 140 mg   p.o. daily 30 to 60 minutes before radiation.  2.  Continue Sancuso/Compazine for control of emesis.  3.  Continue to taper steroids per Radiation Oncology recommendations.  4.  Proctofoam HC twice daily for seven days for management of hemorrhoids.  5.  Podiatry referral in regard to the patient's nail complaints.  6.  Return in one week with interval CBC, BMP, and magnesium.      KINGSLEY/HN  dd: 01/25/2019 09:46:52 (CST)  td: 01/25/2019 21:15:37 (CST)  Doc ID   #[id number]  Job ID #[id number]    CC:

## 2019-01-28 ENCOUNTER — TELEPHONE (OUTPATIENT)
Dept: HEMATOLOGY/ONCOLOGY | Facility: CLINIC | Age: 69
End: 2019-01-28

## 2019-02-01 ENCOUNTER — PATIENT MESSAGE (OUTPATIENT)
Dept: NEUROSURGERY | Facility: CLINIC | Age: 69
End: 2019-02-01

## 2019-02-01 ENCOUNTER — LAB VISIT (OUTPATIENT)
Dept: LAB | Facility: HOSPITAL | Age: 69
End: 2019-02-01
Attending: INTERNAL MEDICINE
Payer: MEDICARE

## 2019-02-01 ENCOUNTER — OFFICE VISIT (OUTPATIENT)
Dept: HEMATOLOGY/ONCOLOGY | Facility: CLINIC | Age: 69
End: 2019-02-01
Payer: MEDICARE

## 2019-02-01 VITALS
HEART RATE: 95 BPM | HEIGHT: 66 IN | RESPIRATION RATE: 18 BRPM | TEMPERATURE: 99 F | DIASTOLIC BLOOD PRESSURE: 76 MMHG | BODY MASS INDEX: 23.88 KG/M2 | SYSTOLIC BLOOD PRESSURE: 127 MMHG | WEIGHT: 148.56 LBS

## 2019-02-01 DIAGNOSIS — T45.1X5A CHEMOTHERAPY-INDUCED THROMBOCYTOPENIA: ICD-10-CM

## 2019-02-01 DIAGNOSIS — T45.1X5A CINV (CHEMOTHERAPY-INDUCED NAUSEA AND VOMITING): ICD-10-CM

## 2019-02-01 DIAGNOSIS — D64.81 ANTINEOPLASTIC CHEMOTHERAPY INDUCED ANEMIA: ICD-10-CM

## 2019-02-01 DIAGNOSIS — C71.9 GBM (GLIOBLASTOMA MULTIFORME): Primary | ICD-10-CM

## 2019-02-01 DIAGNOSIS — T45.1X5A ANTINEOPLASTIC CHEMOTHERAPY INDUCED ANEMIA: ICD-10-CM

## 2019-02-01 DIAGNOSIS — D69.59 CHEMOTHERAPY-INDUCED THROMBOCYTOPENIA: ICD-10-CM

## 2019-02-01 DIAGNOSIS — D64.9 NORMOCYTIC ANEMIA: ICD-10-CM

## 2019-02-01 DIAGNOSIS — C71.9 GBM (GLIOBLASTOMA MULTIFORME): ICD-10-CM

## 2019-02-01 DIAGNOSIS — R11.2 CINV (CHEMOTHERAPY-INDUCED NAUSEA AND VOMITING): ICD-10-CM

## 2019-02-01 LAB
ANION GAP SERPL CALC-SCNC: 6 MMOL/L
BASOPHILS NFR BLD: 0 %
BUN SERPL-MCNC: 19 MG/DL
CALCIUM SERPL-MCNC: 9.1 MG/DL
CHLORIDE SERPL-SCNC: 106 MMOL/L
CO2 SERPL-SCNC: 28 MMOL/L
CREAT SERPL-MCNC: 0.64 MG/DL
DIFFERENTIAL METHOD: ABNORMAL
EOSINOPHIL NFR BLD: 1 %
ERYTHROCYTE [DISTWIDTH] IN BLOOD BY AUTOMATED COUNT: 18.5 %
EST. GFR  (AFRICAN AMERICAN): >60 ML/MIN/1.73 M^2
EST. GFR  (NON AFRICAN AMERICAN): >60 ML/MIN/1.73 M^2
GLUCOSE SERPL-MCNC: 98 MG/DL
HCT VFR BLD AUTO: 32.8 %
HGB BLD-MCNC: 10.7 G/DL
LYMPHOCYTES NFR BLD: 7 %
MAGNESIUM SERPL-MCNC: 2.1 MG/DL
MCH RBC QN AUTO: 32.3 PG
MCHC RBC AUTO-ENTMCNC: 32.6 G/DL
MCV RBC AUTO: 99 FL
METAMYELOCYTES NFR BLD MANUAL: 1 %
MONOCYTES NFR BLD: 2 %
MYELOCYTES NFR BLD MANUAL: 1 %
NEUTROPHILS # BLD AUTO: 4.4 K/UL
NEUTROPHILS NFR BLD: 88 %
NRBC BLD-RTO: 1 /100 WBC
PLATELET # BLD AUTO: 95 K/UL
PLATELET BLD QL SMEAR: ABNORMAL
PMV BLD AUTO: 9.3 FL
POIKILOCYTOSIS BLD QL SMEAR: SLIGHT
POLYCHROMASIA BLD QL SMEAR: ABNORMAL
POTASSIUM SERPL-SCNC: 4.5 MMOL/L
RBC # BLD AUTO: 3.31 M/UL
SODIUM SERPL-SCNC: 140 MMOL/L
TOXIC GRANULES BLD QL SMEAR: PRESENT
WBC # BLD AUTO: 5 K/UL

## 2019-02-01 PROCEDURE — 99999 PR PBB SHADOW E&M-EST. PATIENT-LVL III: ICD-10-PCS | Mod: PBBFAC,,, | Performed by: INTERNAL MEDICINE

## 2019-02-01 PROCEDURE — 85027 COMPLETE CBC AUTOMATED: CPT

## 2019-02-01 PROCEDURE — 99214 OFFICE O/P EST MOD 30 MIN: CPT | Mod: S$GLB,,, | Performed by: INTERNAL MEDICINE

## 2019-02-01 PROCEDURE — 80048 BASIC METABOLIC PNL TOTAL CA: CPT

## 2019-02-01 PROCEDURE — 99999 PR PBB SHADOW E&M-EST. PATIENT-LVL III: CPT | Mod: PBBFAC,,, | Performed by: INTERNAL MEDICINE

## 2019-02-01 PROCEDURE — 85007 BL SMEAR W/DIFF WBC COUNT: CPT | Mod: PN

## 2019-02-01 PROCEDURE — 80048 BASIC METABOLIC PNL TOTAL CA: CPT | Mod: PN

## 2019-02-01 PROCEDURE — 99214 PR OFFICE/OUTPT VISIT, EST, LEVL IV, 30-39 MIN: ICD-10-PCS | Mod: S$GLB,,, | Performed by: INTERNAL MEDICINE

## 2019-02-01 PROCEDURE — 85027 COMPLETE CBC AUTOMATED: CPT | Mod: PN

## 2019-02-01 PROCEDURE — 83735 ASSAY OF MAGNESIUM: CPT

## 2019-02-01 PROCEDURE — 36415 COLL VENOUS BLD VENIPUNCTURE: CPT | Mod: PN

## 2019-02-01 PROCEDURE — 85007 BL SMEAR W/DIFF WBC COUNT: CPT

## 2019-02-01 PROCEDURE — 83735 ASSAY OF MAGNESIUM: CPT | Mod: PN

## 2019-02-01 NOTE — PROGRESS NOTES
HISTORY OF PRESENT ILLNESS:  The patient is a 68-year-old white female well   known to me for incompletely resected glioblastoma multiforme who is receiving   XRT, radiosensitizing doses of Temodar and returns to clinic for evaluation   prior to next week's therapy, which will only include Monday and Tuesday after   which radiation will be finished.  No new complaints.  No new pertinent findings   on a 14-point review of systems.    PHYSICAL EXAMINATION:  GENERAL:  Well-developed, well-nourished, elderly, frail, white female confined   to a wheelchair.  VITAL SIGNS:  Weight of 148-1/2 pounds (stable).  HEENT:  Normocephalic, atraumatic.  Oral mucosa pink and moist.  Lips without   lesions.  Tongue midline.  Oropharynx clear.  Nonicteric sclerae.   NECK:  Supple, no adenopathy.  No carotid bruits, thyromegaly or thyroid nodule.                                                                        HEART:  Regular rate and rhythm without murmur, gallop or rub.                LUNGS:  Clear to auscultation bilaterally.  Normal respiratory effort.       ABDOMEN:  Soft, nontender, nondistended with positive normoactive bowel sounds,   no hepatosplenomegaly.    EXTREMITIES:  No cyanosis, clubbing or edema.  Distal pulses are intact.  The   patient's right upper extremity remains immobilized in a sling.                                             AXILLAE AND GROIN:  No palpable pathologic lymphadenopathy is appreciated.        SKIN:  Intact/turgor normal                                                              NEUROLOGIC:  Cranial nerves II-XII grossly intact.  Motor:  Good muscle bulk and   tone.  Strength/sensory 5/5 throughout.  Gait stable.  There are chronic/stable   deficits following the patient's craniotomy.    LABORATORY:  White count 5, H and H 10.7 and 32.8, platelet count of 95.    Chemistry:  Sodium 140, potassium 4.5, chloride 106, CO2 28, BUN 19, creatinine   0.6, glucose 98, calcium 9.1, mag 2.1.  GFR is  greater than 60.    IMPRESSION:  1.  Incompletely resected glioblastoma multiforme.  2.  Right upper extremity weakness/visual disturbance/gait impairment - stable.  3.  Chemotherapy-associated anemia.    4.  Chemotherapy associated thrombocytopenia.  5.  Chemotherapy-induced nausea and vomiting - well palliated with Sancuso.    PLAN:  1.  Proceed with final two days of radiation.  2.  Temodar 140 mg p.o. daily x2 prior to Monday and Tuesday's radiation.  3.  Continue use of Sancuso/Compazine for control of emesis.  4.  Return to clinic four weeks from now with interval CBC, CMP, LDH, magnesium,   and interval MRI of the brain before embarking upon every 28 day full dose   Temodar.      KINGSLEY/HN  dd: 02/01/2019 12:00:57 (CST)  td: 02/01/2019 22:54:17 (CST)  Doc ID   #[id number]  Job ID #[id number]    CC:

## 2019-02-24 ENCOUNTER — PATIENT MESSAGE (OUTPATIENT)
Dept: HEMATOLOGY/ONCOLOGY | Facility: CLINIC | Age: 69
End: 2019-02-24

## 2019-02-25 ENCOUNTER — TELEPHONE (OUTPATIENT)
Dept: HEMATOLOGY/ONCOLOGY | Facility: CLINIC | Age: 69
End: 2019-02-25

## 2019-02-25 NOTE — TELEPHONE ENCOUNTER
Pt came to lab to have cbc done due to feeling symptomatic. Pt's H/H was 8.2/26.5. Assessed pt - pt stated she was a little sob but not as much as in the past. Pt was notified of her H/H. Pt stated her numbers went up from the last time. Pt will have labs again if symptoms of sob/weakness starts again. Pt does have HH as well that will draw lab if needed. Dr Gómez to be advised.     Dr Gómez notified. Per Dr Gómez pt doesn't need to have HH done lab on Thursday unless symptomatic. Pt to be notified of Dr Ramirez rec.

## 2019-02-25 NOTE — TELEPHONE ENCOUNTER
Spoke with Pt's  about her f/u appt's. Pt scheduled for MRI on 3/1/19. Pt scheduled for labs and appt with Dr Denney on 3/6/19. Pt's  verbalized understanding.

## 2019-02-25 NOTE — TELEPHONE ENCOUNTER
----- Message from Libia Santacruz sent at 2/25/2019 12:43 PM CST -----  Type: Needs Medical Advice    Who Called:  /Miles Love Call Back Number: [phone]  Additional Information: The MRI tomorrow is only 21 days out of her treatment. He was told that it needed to be at least 25 days out. Please call to advise.

## 2019-02-25 NOTE — TELEPHONE ENCOUNTER
Called patient to possibly move appointment to 1pm to allow for us to schedule a consult. No answer and no voicemail

## 2019-03-06 ENCOUNTER — OFFICE VISIT (OUTPATIENT)
Dept: HEMATOLOGY/ONCOLOGY | Facility: CLINIC | Age: 69
End: 2019-03-06
Payer: MEDICARE

## 2019-03-06 ENCOUNTER — HOSPITAL ENCOUNTER (OUTPATIENT)
Dept: RADIOLOGY | Facility: HOSPITAL | Age: 69
Discharge: HOME | End: 2019-03-06
Attending: INTERNAL MEDICINE
Payer: MEDICARE

## 2019-03-06 VITALS
BODY MASS INDEX: 23.88 KG/M2 | WEIGHT: 148.56 LBS | HEART RATE: 83 BPM | TEMPERATURE: 99 F | SYSTOLIC BLOOD PRESSURE: 188 MMHG | RESPIRATION RATE: 18 BRPM | HEIGHT: 66 IN | DIASTOLIC BLOOD PRESSURE: 85 MMHG

## 2019-03-06 DIAGNOSIS — D64.81 ANTINEOPLASTIC CHEMOTHERAPY INDUCED ANEMIA: ICD-10-CM

## 2019-03-06 DIAGNOSIS — R60.0 BILATERAL LOWER EXTREMITY EDEMA: ICD-10-CM

## 2019-03-06 DIAGNOSIS — C71.9 GBM (GLIOBLASTOMA MULTIFORME): Primary | ICD-10-CM

## 2019-03-06 DIAGNOSIS — T45.1X5A ANTINEOPLASTIC CHEMOTHERAPY INDUCED ANEMIA: ICD-10-CM

## 2019-03-06 DIAGNOSIS — D69.59 CHEMOTHERAPY-INDUCED THROMBOCYTOPENIA: ICD-10-CM

## 2019-03-06 DIAGNOSIS — D70.1 LEUKOPENIA DUE TO ANTINEOPLASTIC CHEMOTHERAPY: ICD-10-CM

## 2019-03-06 DIAGNOSIS — D68.59 THROMBOPHILIA: ICD-10-CM

## 2019-03-06 DIAGNOSIS — C71.9 GBM (GLIOBLASTOMA MULTIFORME): ICD-10-CM

## 2019-03-06 DIAGNOSIS — T45.1X5A LEUKOPENIA DUE TO ANTINEOPLASTIC CHEMOTHERAPY: ICD-10-CM

## 2019-03-06 DIAGNOSIS — T45.1X5A CHEMOTHERAPY-INDUCED THROMBOCYTOPENIA: ICD-10-CM

## 2019-03-06 DIAGNOSIS — I82.4Y3 DVT, LOWER EXTREMITY, PROXIMAL, ACUTE, BILATERAL: ICD-10-CM

## 2019-03-06 DIAGNOSIS — G40.909 SEIZURE DISORDER: Primary | ICD-10-CM

## 2019-03-06 PROBLEM — I82.409 DVT (DEEP VENOUS THROMBOSIS): Status: ACTIVE | Noted: 2019-03-06

## 2019-03-06 PROCEDURE — 99215 OFFICE O/P EST HI 40 MIN: CPT | Mod: S$GLB,,, | Performed by: INTERNAL MEDICINE

## 2019-03-06 PROCEDURE — 93970 EXTREMITY STUDY: CPT | Mod: TC,PO

## 2019-03-06 PROCEDURE — 1101F PR PT FALLS ASSESS DOC 0-1 FALLS W/OUT INJ PAST YR: ICD-10-PCS | Mod: CPTII,S$GLB,, | Performed by: INTERNAL MEDICINE

## 2019-03-06 PROCEDURE — 1101F PT FALLS ASSESS-DOCD LE1/YR: CPT | Mod: CPTII,S$GLB,, | Performed by: INTERNAL MEDICINE

## 2019-03-06 PROCEDURE — 93970 EXTREMITY STUDY: CPT | Mod: 26,,, | Performed by: RADIOLOGY

## 2019-03-06 PROCEDURE — 99999 PR PBB SHADOW E&M-EST. PATIENT-LVL III: ICD-10-PCS | Mod: PBBFAC,,, | Performed by: INTERNAL MEDICINE

## 2019-03-06 PROCEDURE — 99999 PR PBB SHADOW E&M-EST. PATIENT-LVL III: CPT | Mod: PBBFAC,,, | Performed by: INTERNAL MEDICINE

## 2019-03-06 PROCEDURE — 99215 PR OFFICE/OUTPT VISIT, EST, LEVL V, 40-54 MIN: ICD-10-PCS | Mod: S$GLB,,, | Performed by: INTERNAL MEDICINE

## 2019-03-06 PROCEDURE — 93970 US LOWER EXTREMITY VEINS BILATERAL: ICD-10-PCS | Mod: 26,,, | Performed by: RADIOLOGY

## 2019-03-06 RX ORDER — LEVETIRACETAM 750 MG/1
750 TABLET ORAL 2 TIMES DAILY
Qty: 60 TABLET | Refills: 2 | Status: SHIPPED | OUTPATIENT
Start: 2019-03-06 | End: 2020-03-05

## 2019-03-07 NOTE — PROGRESS NOTES
History of present illness:  The patient is a 68-year-old white female well known to me for incompletely resected glioblastoma multiforme who has completed XRT and radiosensitizing doses of Temodar.  Patient returns to clinic to review interval laboratory in scans prior to embarking upon single agent Temodar.  Over the last 10 days, she has developed increasing swelling of both lower extremities but states that this is greater on the left than the right.  The left lower extremity is also tense and tender.  No other complaints for pertinent findings on a 14 point review of systems.    Physical examination:  Well-developed, elderly, white female, confined to wheelchair, has a weight of 148.5 lb (stable).  VITAL SIGNS: Documented  and reviewed this visit.  HEENT: Normocephalic, atraumatic. Oral mucosa pink and moist. Lips without   lesions. Tongue midline. Oropharynx clear. Nonicteric sclerae.   NECK: Supple, no adenopathy. No carotid bruits, thyromegaly or thyroid nodule.   HEART: Regular rate and rhythm without murmur, gallop or rub.   LUNGS: Clear to auscultation bilaterally. Normal respiratory effort.   ABDOMEN: Soft, nontender, nondistended with positive normoactive bowel sounds,   no hepatosplenomegaly.   EXTREMITIES:  Bilateral lower extremity edema, greater on the left than the right. Distal pulses are intact.   AXILLAE AND GROIN: No palpable pathologic lymphadenopathy is appreciated.   SKIN: Intact/turgor normal   NEUROLOGIC:  Patient continues to have some difficulty with word-finding and delayed speech.    Laboratory:  White count 3.9, hemoglobin 10.6, hematocrit 32.1, platelets 89.  Sodium 141, potassium 3.6, chloride 109, CO2 26, BUN 15, creatinine 0.6, glucose 129, calcium 9, magnesium 1.9, liver function test within normal limits, , GFR greater than 60.    MRI of brain:  There is interval progression of the multi lesion appearance demonstrating both increased size with enhancement as well as new  lesions about the left lateral ventricular margin.  There is also corresponding increased edematous appearance adjacent to these lesions.  No significant interval midline shift or hydrocephalus is appreciated.    Bilateral lower extremity venous Doppler:  1. Extensive occlusive deep vein thrombosis involving the left lower extremity extending from the left common femoral vein to the left calf peroneal vein.  2. Occlusive deep vein thrombosis of the right calf peroneal vein.    Impression:  1.  Progressive glioblastoma multiforme despite XRT/Temodar.  2.  Thrombophilia of malignancy manifest as bilateral lower extremity deep venous thromboses-more extensive on the left than the right.    Plan:  1.  I have had extensive discussion of the case with Dr. Melo in regard to acute management.  We both agree that the patient needs to be protected from thromboembolism and that there is increased risk of hemorrhage with use of anticoagulation in light of progressive/uncontrolled CNS disease.  Management options discussed included anticoagulation with Coumadin which would allow for reversal as well as use of IVC filter without anticoagulation.  2.  In regard to progressive disease, we have agreed to pursue Optune as well as second-line chemotherapy consisting of Camptosar/Avastin.  3.  40 min of contact time was spent counseling the patient regarding use of anticoagulation versus IVC filter placement.  She is most fearful of CNS bleed with residual neurologic deficits and has opted for IVC filter placement.  4.  Dr. Valente with [facility] has been contacted and order to coordinate direct admission and placement of filter.

## 2019-03-08 ENCOUNTER — TELEPHONE (OUTPATIENT)
Dept: HEMATOLOGY/ONCOLOGY | Facility: CLINIC | Age: 69
End: 2019-03-08

## 2019-03-08 NOTE — TELEPHONE ENCOUNTER
----- Message from Kayley Decker sent at 3/8/2019  9:36 AM CST -----  Contact: geo melton/ st [address] [phone]   [phone]   Requesting hospital follow up for patient   Patient advised to be seen within 2 weeks  Please call

## 2019-03-08 NOTE — TELEPHONE ENCOUNTER
Spoke w/ Dee Vega at [facility].  Pt being discharged, today.  Pt needs 2 week f/u w Dr. Denney in 2 weeks.  Lab appt made and f/u visit made for 3/22/19.  Dee verbalized understanding.

## 2019-03-12 ENCOUNTER — TELEPHONE (OUTPATIENT)
Dept: HEMATOLOGY/ONCOLOGY | Facility: CLINIC | Age: 69
End: 2019-03-12

## 2019-03-12 DIAGNOSIS — C71.9 GBM (GLIOBLASTOMA MULTIFORME): Primary | ICD-10-CM

## 2019-03-12 NOTE — TELEPHONE ENCOUNTER
Called patient to confirm no port has been placed , referral placed to dr. Ly in general surgery. Called wendi office to schedule consult. Notified patient's  of appointment March 18th at 1039

## 2019-03-12 NOTE — TELEPHONE ENCOUNTER
Called and spoke with Dr. Marquez at [facility], Per Dr. Marquez, prior auth  and he will have to make a medical decision on wether to approve Neulasta and aloxi. Will check referral update for approval or denial

## 2019-03-12 NOTE — TELEPHONE ENCOUNTER
----- Message from Jose Aguero sent at 3/12/2019 12:01 PM CDT -----  Contact: , Miles  Patient miss call from your office please call back at [phone]

## 2019-03-13 ENCOUNTER — DOCUMENTATION ONLY (OUTPATIENT)
Dept: HEMATOLOGY/ONCOLOGY | Facility: CLINIC | Age: 69
End: 2019-03-13

## 2019-03-13 ENCOUNTER — PATIENT MESSAGE (OUTPATIENT)
Dept: HEMATOLOGY/ONCOLOGY | Facility: CLINIC | Age: 69
End: 2019-03-13

## 2019-03-13 ENCOUNTER — TELEPHONE (OUTPATIENT)
Dept: HEMATOLOGY/ONCOLOGY | Facility: CLINIC | Age: 69
End: 2019-03-13

## 2019-03-13 DIAGNOSIS — D49.6 BRAIN TUMOR: ICD-10-CM

## 2019-03-13 DIAGNOSIS — C71.9 GLIOMA: ICD-10-CM

## 2019-03-13 DIAGNOSIS — C71.9 GBM (GLIOBLASTOMA MULTIFORME): Primary | ICD-10-CM

## 2019-03-13 NOTE — PROGRESS NOTES
Spoke with Sirisha at [facility].  The order has been placed in Epic and they will contact the patient's  to make arrangements for delivery.

## 2019-03-13 NOTE — TELEPHONE ENCOUNTER
----- Message from RT Neymar sent at 3/13/2019  3:36 PM CDT -----  Contact: ELVIA Garay,[phone] [facility]  ELVIA Garay,[phone] [facility], requesting to clarify the pt's medication: Dexamethasone, thanks.

## 2019-03-15 NOTE — TELEPHONE ENCOUNTER
Called Tanisha with [facility] and informed her that patient should be taking 2mg dexamethasone q12. Verbalized understanding.

## 2019-03-15 NOTE — TELEPHONE ENCOUNTER
Called crystal with [facility] no answer message left. Per dr. parish will need to discuss dexamethasone with dr. Melo's office. Message also sent to dr. Melo's nurse to contact [place] [facility]

## 2019-03-19 ENCOUNTER — DOCUMENTATION ONLY (OUTPATIENT)
Dept: INFUSION THERAPY | Facility: HOSPITAL | Age: 69
End: 2019-03-19

## 2019-03-19 NOTE — PROGRESS NOTES
[3/19/2019 3:17 PM]  Crystal Villafana:    Also, Ashley Spencer [id number], the neulasta has been pulled so they will approve.  Need to schedule.     3/19/2019 3:38 PM]    ashley spencer millie [id number] 03/26/19 @10:30     [3/19/2019 3:38 PM]  Crystal Villafana:    ty

## 2019-03-22 ENCOUNTER — DOCUMENTATION ONLY (OUTPATIENT)
Dept: INFUSION THERAPY | Facility: HOSPITAL | Age: 69
End: 2019-03-22

## 2019-03-22 ENCOUNTER — LAB VISIT (OUTPATIENT)
Dept: LAB | Facility: HOSPITAL | Age: 69
End: 2019-03-22
Attending: INTERNAL MEDICINE
Payer: MEDICARE

## 2019-03-22 ENCOUNTER — OFFICE VISIT (OUTPATIENT)
Dept: HEMATOLOGY/ONCOLOGY | Facility: CLINIC | Age: 69
End: 2019-03-22
Payer: MEDICARE

## 2019-03-22 VITALS
RESPIRATION RATE: 14 BRPM | HEIGHT: 66 IN | HEART RATE: 70 BPM | OXYGEN SATURATION: 96 % | DIASTOLIC BLOOD PRESSURE: 75 MMHG | WEIGHT: 157.19 LBS | TEMPERATURE: 98 F | BODY MASS INDEX: 25.26 KG/M2 | SYSTOLIC BLOOD PRESSURE: 166 MMHG

## 2019-03-22 DIAGNOSIS — T45.1X5A ANTINEOPLASTIC CHEMOTHERAPY INDUCED ANEMIA: ICD-10-CM

## 2019-03-22 DIAGNOSIS — R11.2 CINV (CHEMOTHERAPY-INDUCED NAUSEA AND VOMITING): ICD-10-CM

## 2019-03-22 DIAGNOSIS — T45.1X5A CINV (CHEMOTHERAPY-INDUCED NAUSEA AND VOMITING): ICD-10-CM

## 2019-03-22 DIAGNOSIS — T45.1X5A CHEMOTHERAPY-INDUCED THROMBOCYTOPENIA: ICD-10-CM

## 2019-03-22 DIAGNOSIS — D64.81 ANTINEOPLASTIC CHEMOTHERAPY INDUCED ANEMIA: ICD-10-CM

## 2019-03-22 DIAGNOSIS — K64.4 INFLAMED EXTERNAL HEMORRHOID: ICD-10-CM

## 2019-03-22 DIAGNOSIS — D69.59 CHEMOTHERAPY-INDUCED THROMBOCYTOPENIA: ICD-10-CM

## 2019-03-22 DIAGNOSIS — C71.9 GBM (GLIOBLASTOMA MULTIFORME): ICD-10-CM

## 2019-03-22 DIAGNOSIS — L60.0 INGROWING NAIL, RIGHT GREAT TOE: ICD-10-CM

## 2019-03-22 DIAGNOSIS — C71.9 GBM (GLIOBLASTOMA MULTIFORME): Primary | ICD-10-CM

## 2019-03-22 LAB
ALBUMIN SERPL BCP-MCNC: 3.7 G/DL
ALP SERPL-CCNC: 53 U/L
ALT SERPL W/O P-5'-P-CCNC: 22 U/L
ANION GAP SERPL CALC-SCNC: 6 MMOL/L
AST SERPL-CCNC: 23 U/L
BASOPHILS # BLD AUTO: 0.01 K/UL
BASOPHILS NFR BLD: 0.2 %
BILIRUB SERPL-MCNC: 0.6 MG/DL
BUN SERPL-MCNC: 19 MG/DL
CALCIUM SERPL-MCNC: 9 MG/DL
CHLORIDE SERPL-SCNC: 106 MMOL/L
CO2 SERPL-SCNC: 28 MMOL/L
CREAT SERPL-MCNC: 0.55 MG/DL
DIFFERENTIAL METHOD: ABNORMAL
EOSINOPHIL # BLD AUTO: 0 K/UL
EOSINOPHIL NFR BLD: 0 %
ERYTHROCYTE [DISTWIDTH] IN BLOOD BY AUTOMATED COUNT: 14.9 %
EST. GFR  (AFRICAN AMERICAN): >60 ML/MIN/1.73 M^2
EST. GFR  (NON AFRICAN AMERICAN): >60 ML/MIN/1.73 M^2
GLUCOSE SERPL-MCNC: 154 MG/DL
HCT VFR BLD AUTO: 32.8 %
HGB BLD-MCNC: 10.8 G/DL
IMM GRANULOCYTES # BLD AUTO: 0.12 K/UL
IMM GRANULOCYTES NFR BLD AUTO: 2.2 %
LYMPHOCYTES # BLD AUTO: 0.6 K/UL
LYMPHOCYTES NFR BLD: 11.2 %
MAGNESIUM SERPL-MCNC: 2 MG/DL
MCH RBC QN AUTO: 35.5 PG
MCHC RBC AUTO-ENTMCNC: 32.9 G/DL
MCV RBC AUTO: 108 FL
MONOCYTES # BLD AUTO: 0.2 K/UL
MONOCYTES NFR BLD: 4.1 %
NEUTROPHILS # BLD AUTO: 4.4 K/UL
NEUTROPHILS NFR BLD: 82.3 %
NRBC BLD-RTO: 1 /100 WBC
PLATELET # BLD AUTO: 111 K/UL
PMV BLD AUTO: 8.9 FL
POTASSIUM SERPL-SCNC: 4.1 MMOL/L
PROT SERPL-MCNC: 6.3 G/DL
RBC # BLD AUTO: 3.04 M/UL
SODIUM SERPL-SCNC: 140 MMOL/L
WBC # BLD AUTO: 5.38 K/UL

## 2019-03-22 PROCEDURE — 3288F FALL RISK ASSESSMENT DOCD: CPT | Mod: CPTII,S$GLB,, | Performed by: INTERNAL MEDICINE

## 2019-03-22 PROCEDURE — 3078F DIAST BP <80 MM HG: CPT | Mod: CPTII,S$GLB,, | Performed by: INTERNAL MEDICINE

## 2019-03-22 PROCEDURE — 83735 ASSAY OF MAGNESIUM: CPT

## 2019-03-22 PROCEDURE — 1100F PTFALLS ASSESS-DOCD GE2>/YR: CPT | Mod: CPTII,S$GLB,, | Performed by: INTERNAL MEDICINE

## 2019-03-22 PROCEDURE — 3077F PR MOST RECENT SYSTOLIC BLOOD PRESSURE >= 140 MM HG: ICD-10-PCS | Mod: CPTII,S$GLB,, | Performed by: INTERNAL MEDICINE

## 2019-03-22 PROCEDURE — 3288F PR FALLS RISK ASSESSMENT DOCUMENTED: ICD-10-PCS | Mod: CPTII,S$GLB,, | Performed by: INTERNAL MEDICINE

## 2019-03-22 PROCEDURE — 3077F SYST BP >= 140 MM HG: CPT | Mod: CPTII,S$GLB,, | Performed by: INTERNAL MEDICINE

## 2019-03-22 PROCEDURE — 85025 COMPLETE CBC W/AUTO DIFF WBC: CPT

## 2019-03-22 PROCEDURE — 3078F PR MOST RECENT DIASTOLIC BLOOD PRESSURE < 80 MM HG: ICD-10-PCS | Mod: CPTII,S$GLB,, | Performed by: INTERNAL MEDICINE

## 2019-03-22 PROCEDURE — 99999 PR PBB SHADOW E&M-EST. PATIENT-LVL III: ICD-10-PCS | Mod: PBBFAC,,, | Performed by: INTERNAL MEDICINE

## 2019-03-22 PROCEDURE — 99215 PR OFFICE/OUTPT VISIT, EST, LEVL V, 40-54 MIN: ICD-10-PCS | Mod: S$GLB,,, | Performed by: INTERNAL MEDICINE

## 2019-03-22 PROCEDURE — 1100F PR PT FALLS ASSESS DOC 2+ FALLS/FALL W/INJURY/YR: ICD-10-PCS | Mod: CPTII,S$GLB,, | Performed by: INTERNAL MEDICINE

## 2019-03-22 PROCEDURE — 36415 COLL VENOUS BLD VENIPUNCTURE: CPT | Mod: PN

## 2019-03-22 PROCEDURE — 80053 COMPREHEN METABOLIC PANEL: CPT | Mod: PN

## 2019-03-22 PROCEDURE — 83735 ASSAY OF MAGNESIUM: CPT | Mod: PN

## 2019-03-22 PROCEDURE — 99999 PR PBB SHADOW E&M-EST. PATIENT-LVL III: CPT | Mod: PBBFAC,,, | Performed by: INTERNAL MEDICINE

## 2019-03-22 PROCEDURE — 80053 COMPREHEN METABOLIC PANEL: CPT

## 2019-03-22 PROCEDURE — 85025 COMPLETE CBC W/AUTO DIFF WBC: CPT | Mod: PN

## 2019-03-22 PROCEDURE — 99215 OFFICE O/P EST HI 40 MIN: CPT | Mod: S$GLB,,, | Performed by: INTERNAL MEDICINE

## 2019-03-22 RX ORDER — HEPARIN 100 UNIT/ML
500 SYRINGE INTRAVENOUS
Status: CANCELLED | OUTPATIENT
Start: 2019-03-26

## 2019-03-22 RX ORDER — ATROPINE SULFATE 0.4 MG/ML
0.4 INJECTION, SOLUTION ENDOTRACHEAL; INTRAMEDULLARY; INTRAMUSCULAR; INTRAVENOUS; SUBCUTANEOUS
Status: CANCELLED
Start: 2019-03-26

## 2019-03-22 RX ORDER — SODIUM CHLORIDE 0.9 % (FLUSH) 0.9 %
10 SYRINGE (ML) INJECTION
Status: CANCELLED | OUTPATIENT
Start: 2019-03-26

## 2019-03-22 RX ORDER — ACETAMINOPHEN 500 MG
1000 TABLET ORAL
Status: CANCELLED
Start: 2019-03-26

## 2019-03-22 RX ORDER — DIPHENHYDRAMINE HCL 25 MG
25 CAPSULE ORAL
Status: CANCELLED
Start: 2019-03-26

## 2019-03-22 NOTE — PROGRESS NOTES
Ashley Spencer, 68 y.o.  1950    Diagnosis: Progressive glioblastoma multiforme      History: S/P XRT and Temodar. S/P incomplete resection of glioblasoma multiforme.      Labs:  WBC   Date Value Ref Range Status   03/07/2019 3.55 (L) 3.90 - 12.70 K/uL Final     Creatinine   Date Value Ref Range Status   03/07/2019 0.55 0.50 - 1.40 mg/dL Final       The patient is scheduled to start the following infusion: OP GBM CAMPTOSAR + AVASTIN    28-day cycle for 6 cycles of:    Chemotherapy:   -bevacizumab (AVASTIN) 10 mg/kg = 695 mg in sodium chloride 0.9% 100 mL chemo infusion, Intravenous, day 1 and 15, over 90 mins on day 1 of cycle 1 only, 60 mins on day 15 of cycle 1 only, then over 30 mins every cycle after that.     -irinotecan (CAMPTOSAR) 125 mg/m2 = 224 mg in sodium chloride 0.9% 500 mL chemo infusion, Intravenous, day 1 and 15, administer over 90 mins     Premeds  -Palonosetron 0.25 mg/ dexamethasone 10 mg IVPB  -Acetaminophen tab 1000 mg PO  -Diphenhydramine capsule 25 mg   -Atropine 0.4 mg subcutaneous    A reference was added to notes section showing use of Bevacizumab 10 mg/kg and irinotecan 125 mg/m2 every 2 weeks in progressive Glioblastoma Multiforme

## 2019-03-22 NOTE — PROGRESS NOTES
History of present illness:  68-year-old white female well known to me for incompletely resected glioblastoma multiforme who completed XRT/radiosensitizing Temodar and had documented disease progression despite therapy.  Patient's course has been complicated by the development of left lower extremity DVT for which IVC filter was placed.  Patient returns to clinic in hospital follow-up prior to initiating therapy with CPT 11/Avastin.  Since leaving the hospital, the patient continues to experience difficulties with left lower extremity swelling.  Symptoms of a facial persist.  She is ambulating with the assistance of wheelchair and keeps her right upper extremity immobilized in a sling due to neuro deficits. No other complaints for pertinent findings on a 14 point review of systems.    Physical examination:  Well-developed, well-nourished, elderly, ill-appearing, white female, confined to the wheelchair, who has a weight of 157 lb (increased by 9.5 lb).  VITAL SIGNS: Documented  and reviewed this visit.  HEENT: Normocephalic, atraumatic. Oral mucosa pink and moist. Lips without   lesions. Tongue midline. Oropharynx clear. Nonicteric sclerae.   NECK: Supple, no adenopathy. No carotid bruits, thyromegaly or thyroid nodule.   HEART: Regular rate and rhythm without murmur, gallop or rub.   LUNGS: Clear to auscultation bilaterally. Normal respiratory effort.   ABDOMEN: Soft, nontender, nondistended with positive normoactive bowel sounds,   no hepatosplenomegaly.   EXTREMITIES:  Stable left lower extremity edema. Distal pulses are intact. Right upper extremity immobilized in a sling.  AXILLAE AND GROIN: No palpable pathologic lymphadenopathy is appreciated.   SKIN: Intact/turgor normal   NEUROLOGIC:  Persistent aphasic.  Ambulates with the assistance of wheelchair.    Laboratory:  White count 5.4, hemoglobin 10.8, hematocrit 32.8, platelets 111, absolute neutrophil count of 4400.  Sodium 140, potassium 4.1, chloride 106,  CO2 28, BUN 19, creatinine 0.6, glucose 154, calcium 9, magnesium 2, liver function test within normal limits, GFR is greater than 60.    Impression:  1.  Progressive glioblastoma multiforme.  2.  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  3.  Chemotherapy associated anemia.  4.  Chemotherapy associated thrombocytopenia.    Plan:  1.  Initiate salvage therapy to consist of CPT 11   125 milligram/meter squared (225 mg) IV day 1 with typical atropine premedication and Avastin 10 milligram/kilogram (695 mg) with typical Tylenol and Benadryl premedications.  2.  Patient will receive DP 10/0.25 and p.r.n. Compazine for control of acute and delayed emesis.  3.  Return to clinic 2 weeks from now at which time will proceed with cycle 1, day 15 of therapy.  Patient will have interval CBC, CMP, and magnesium.

## 2019-03-25 DIAGNOSIS — R19.7 DIARRHEA, UNSPECIFIED TYPE: Primary | ICD-10-CM

## 2019-03-25 DIAGNOSIS — R11.0 NAUSEA: ICD-10-CM

## 2019-03-25 NOTE — PROGRESS NOTES
Patient and family here for chemotherapy education.  Discussed possible side effects with self-care tips. Provided copies of [facility] information.   Patient was allowed time to voice questions and concerns.  All were answered and patient verbalized understanding.     Patient does have prescriptions for anti-nausea medications. Instructed on how to take.    Voiced understanding.  Consent signed and witnessed.

## 2019-03-26 ENCOUNTER — INFUSION (OUTPATIENT)
Dept: INFUSION THERAPY | Facility: HOSPITAL | Age: 69
End: 2019-03-26
Attending: INTERNAL MEDICINE
Payer: MEDICARE

## 2019-03-26 VITALS
SYSTOLIC BLOOD PRESSURE: 181 MMHG | DIASTOLIC BLOOD PRESSURE: 91 MMHG | RESPIRATION RATE: 16 BRPM | TEMPERATURE: 99 F | BODY MASS INDEX: 24.24 KG/M2 | HEIGHT: 64 IN | WEIGHT: 142 LBS | HEART RATE: 74 BPM | OXYGEN SATURATION: 95 %

## 2019-03-26 DIAGNOSIS — C71.9 GBM (GLIOBLASTOMA MULTIFORME): Primary | ICD-10-CM

## 2019-03-26 PROCEDURE — 96375 TX/PRO/DX INJ NEW DRUG ADDON: CPT | Mod: PN

## 2019-03-26 PROCEDURE — 96367 TX/PROPH/DG ADDL SEQ IV INF: CPT | Mod: PN

## 2019-03-26 PROCEDURE — A4216 STERILE WATER/SALINE, 10 ML: HCPCS | Mod: PN | Performed by: INTERNAL MEDICINE

## 2019-03-26 PROCEDURE — 96413 CHEMO IV INFUSION 1 HR: CPT | Mod: PN

## 2019-03-26 PROCEDURE — 96415 CHEMO IV INFUSION ADDL HR: CPT | Mod: PN

## 2019-03-26 PROCEDURE — 96417 CHEMO IV INFUS EACH ADDL SEQ: CPT | Mod: PN

## 2019-03-26 PROCEDURE — 63600175 PHARM REV CODE 636 W HCPCS: Mod: PN | Performed by: INTERNAL MEDICINE

## 2019-03-26 PROCEDURE — 25000003 PHARM REV CODE 250: Mod: PN | Performed by: INTERNAL MEDICINE

## 2019-03-26 RX ORDER — PROCHLORPERAZINE MALEATE 10 MG
10 TABLET ORAL EVERY 6 HOURS PRN
Qty: 60 TABLET | Refills: 6 | Status: ON HOLD | OUTPATIENT
Start: 2019-03-26 | End: 2019-04-10 | Stop reason: HOSPADM

## 2019-03-26 RX ORDER — DIPHENHYDRAMINE HCL 25 MG
25 CAPSULE ORAL
Status: COMPLETED | OUTPATIENT
Start: 2019-03-26 | End: 2019-03-26

## 2019-03-26 RX ORDER — ACETAMINOPHEN 500 MG
1000 TABLET ORAL
Status: COMPLETED | OUTPATIENT
Start: 2019-03-26 | End: 2019-03-26

## 2019-03-26 RX ORDER — ATROPINE SULFATE 0.4 MG/ML
0.4 INJECTION, SOLUTION ENDOTRACHEAL; INTRAMEDULLARY; INTRAMUSCULAR; INTRAVENOUS; SUBCUTANEOUS
Status: DISCONTINUED | OUTPATIENT
Start: 2019-03-26 | End: 2019-03-26

## 2019-03-26 RX ORDER — HYOSCYAMINE SULFATE 0.125 MG
125 TABLET ORAL EVERY 6 HOURS PRN
COMMUNITY

## 2019-03-26 RX ORDER — DIPHENOXYLATE HYDROCHLORIDE AND ATROPINE SULFATE 2.5; .025 MG/1; MG/1
1 TABLET ORAL 4 TIMES DAILY PRN
Qty: 30 TABLET | Refills: 1 | Status: ON HOLD | OUTPATIENT
Start: 2019-03-26 | End: 2019-04-10 | Stop reason: HOSPADM

## 2019-03-26 RX ORDER — ATROPINE SULFATE 0.4 MG/ML
0.4 INJECTION, SOLUTION ENDOTRACHEAL; INTRAMEDULLARY; INTRAMUSCULAR; INTRAVENOUS; SUBCUTANEOUS
Status: COMPLETED | OUTPATIENT
Start: 2019-03-26 | End: 2019-03-26

## 2019-03-26 RX ORDER — SODIUM CHLORIDE 0.9 % (FLUSH) 0.9 %
10 SYRINGE (ML) INJECTION
Status: DISCONTINUED | OUTPATIENT
Start: 2019-03-26 | End: 2019-03-26 | Stop reason: HOSPADM

## 2019-03-26 RX ORDER — HEPARIN 100 UNIT/ML
500 SYRINGE INTRAVENOUS
Status: DISCONTINUED | OUTPATIENT
Start: 2019-03-26 | End: 2019-03-26 | Stop reason: HOSPADM

## 2019-03-26 RX ADMIN — DIPHENHYDRAMINE HYDROCHLORIDE 25 MG: 25 CAPSULE ORAL at 10:03

## 2019-03-26 RX ADMIN — ATROPINE SULFATE 0.4 MG: 0.4 INJECTION, SOLUTION INTRAMUSCULAR; INTRAVENOUS; SUBCUTANEOUS at 01:03

## 2019-03-26 RX ADMIN — SODIUM CHLORIDE 224 MG: 0.9 INJECTION, SOLUTION INTRAVENOUS at 01:03

## 2019-03-26 RX ADMIN — DEXAMETHASONE SODIUM PHOSPHATE: 4 INJECTION, SOLUTION INTRA-ARTICULAR; INTRALESIONAL; INTRAMUSCULAR; INTRAVENOUS; SOFT TISSUE at 10:03

## 2019-03-26 RX ADMIN — SODIUM CHLORIDE: 900 INJECTION, SOLUTION INTRAVENOUS at 10:03

## 2019-03-26 RX ADMIN — HEPARIN SODIUM (PORCINE) LOCK FLUSH IV SOLN 100 UNIT/ML 500 UNITS: 100 SOLUTION at 02:03

## 2019-03-26 RX ADMIN — BEVACIZUMAB 695 MG: 400 INJECTION, SOLUTION INTRAVENOUS at 11:03

## 2019-03-26 RX ADMIN — ACETAMINOPHEN 1000 MG: 500 TABLET, FILM COATED ORAL at 10:03

## 2019-03-26 RX ADMIN — Medication 10 ML: at 02:03

## 2019-03-26 NOTE — PATIENT INSTRUCTIONS
Irinotecan injection  What is this medicine?  IRINOTECAN (ir in oh ALEJANDRO teofilo ) is a chemotherapy drug. It is used to treat colon and rectal cancer.  How should I use this medicine?  This drug is given as an infusion into a vein. It is administered in a hospital or clinic by a specially trained health care professional.  Talk to your pediatrician regarding the use of this medicine in children. Special care may be needed.  What side effects may I notice from receiving this medicine?  Side effects that you should report to your doctor or health care professional as soon as possible:  · allergic reactions like skin rash, itching or hives, swelling of the face, lips, or tongue  · low blood counts - this medicine may decrease the number of white blood cells, red blood cells and platelets. You may be at increased risk for infections and bleeding.  · signs of infection - fever or chills, cough, sore throat, pain or difficulty passing urine  · signs of decreased platelets or bleeding - bruising, pinpoint red spots on the skin, black, tarry stools, blood in the urine  · signs of decreased red blood cells - unusually weak or tired, fainting spells, lightheadedness  · breathing problems  · chest pain  · diarrhea  · feeling faint or lightheaded, falls  · flushing, runny nose, sweating during infusion  · mouth sores or pain  · pain, swelling, redness or irritation where injected  · pain, swelling, warmth in the leg  · pain, tingling, numbness in the hands or feet  · problems with balance, talking, walking  · stomach cramps, pain  · trouble passing urine or change in the amount of urine  · vomiting as to be unable to hold down drinks or food  · yellowing of the eyes or skin  Side effects that usually do not require medical attention (report to your doctor or health care professional if they continue or are bothersome):  · constipation  · hair loss  · headache  · loss of appetite  · nausea, vomiting  · stomach upset  What may  interact with this medicine?  Do not take this medicine with any of the following medications:  · atazanavir  · certain medicines for fungal infections like itraconazole and ketoconazole  · [place] Wort  This medicine may also interact with the following medications:  · dexamethasone  · diuretics  · laxatives  · medicines for seizures like carbamazepine, mephobarbital, phenobarbital, phenytoin, primidone  · medicines to increase blood counts like filgrastim, pegfilgrastim, sargramostim  · prochlorperazine  · vaccines  What if I miss a dose?  It is important not to miss your dose. Call your doctor or health care professional if you are unable to keep an appointment.  Where should I keep my medicine?  This drug is given in a hospital or clinic and will not be stored at home.  What should I tell my health care provider before I take this medicine?  They need to know if you have any of these conditions:  · blood disorders  · dehydration  · diarrhea  · infection (especially a virus infection such as chickenpox, cold sores, or herpes)  · liver disease  · low blood counts, like low white cell, platelet, or red cell counts  · recent or ongoing radiation therapy  · an unusual or allergic reaction to irinotecan, sorbitol, other chemotherapy, other medicines, foods, dyes, or preservatives  · pregnant or trying to get pregnant  · breast-feeding  What should I watch for while using this medicine?  Your condition will be monitored carefully while you are receiving this medicine. You will need important blood work done while you are taking this medicine.  This drug may make you feel generally unwell. This is not uncommon, as chemotherapy can affect healthy cells as well as cancer cells. Report any side effects. Continue your course of treatment even though you feel ill unless your doctor tells you to stop.  In some cases, you may be given additional medicines to help with side effects. Follow all directions for their use.  You  may get drowsy or dizzy. Do not drive, use machinery, or do anything that needs mental alertness until you know how this medicine affects you. Do not stand or sit up quickly, especially if you are an older patient. This reduces the risk of dizzy or fainting spells.  Call your doctor or health care professional for advice if you get a fever, chills or sore throat, or other symptoms of a cold or flu. Do not treat yourself. This drug decreases your body's ability to fight infections. Try to avoid being around people who are sick.  This medicine may increase your risk to bruise or bleed. Call your doctor or health care professional if you notice any unusual bleeding.  Be careful brushing and flossing your teeth or using a toothpick because you may get an infection or bleed more easily. If you have any dental work done, tell your dentist you are receiving this medicine.  Avoid taking products that contain aspirin, acetaminophen, ibuprofen, naproxen, or ketoprofen unless instructed by your doctor. These medicines may hide a fever.  Do not become pregnant while taking this medicine. Women should inform their doctor if they wish to become pregnant or think they might be pregnant. There is a potential for serious side effects to an unborn child. Talk to your health care professional or pharmacist for more information. Do not breast-feed an infant while taking this medicine.  NOTE:This sheet is a summary. It may not cover all possible information. If you have questions about this medicine, talk to your doctor, pharmacist, or health care provider. Copyright© 2017 [employer]        Bevacizumab injection  What is this medicine?  BEVACIZUMAB (be va SIZ crow mab) is a monoclonal antibody. It is used to treat cervical cancer, colorectal cancer, glioblastoma multiforme, non-small cell lung cancer (NSCLC), ovarian cancer, and renal cell cancer.  How should I use this medicine?  This medicine is for infusion into a vein. It is given  by a health care professional in a hospital or clinic setting.  Talk to your pediatrician regarding the use of this medicine in children. Special care may be needed.  What side effects may I notice from receiving this medicine?  Side effects that you should report to your doctor or health care professional as soon as possible:  · allergic reactions like skin rash, itching or hives, swelling of the face, lips, or tongue  · signs of infection - fever or chills, cough, sore throat, pain or trouble passing urine  · signs of decreased platelets or bleeding - bruising, pinpoint red spots on the skin, black, tarry stools, nosebleeds, blood in the urine  · breathing problems  · changes in vision  · chest pain  · confusion  · jaw pain, especially after dental work  · mouth sores  · seizures  · severe abdominal pain  · severe headache  · sudden numbness or weakness of the face, arm or leg  · swelling of legs or ankles  · symptoms of a stroke: change in mental awareness, inability to talk or move one side of the body (especially in patients with lung cancer)  · trouble passing urine or change in the amount of urine  · trouble speaking or understanding  · trouble walking, dizziness, loss of balance or coordination  Side effects that usually do not require medical attention (report to your doctor or health care professional if they continue or are bothersome):  · constipation  · diarrhea  · dry skin  · headache  · loss of appetite  · nausea, vomiting  What may interact with this medicine?  Interactions are not expected.  What if I miss a dose?  It is important not to miss your dose. Call your doctor or health care professional if you are unable to keep an appointment.  Where should I keep my medicine?  This drug is given in a hospital or clinic and will not be stored at home.  What should I tell my health care provider before I take this medicine?  They need to know if you have any of these conditions:  · blood clots  · heart  disease, including heart failure, heart attack, or chest pain (angina)  · high blood pressure  · infection (especially a virus infection such as chickenpox, cold sores, or herpes)  · kidney disease  · lung disease  · prior chemotherapy with doxorubicin, daunorubicin, epirubicin, or other anthracycline type chemotherapy agents  · recent or ongoing radiation therapy  · recent surgery  · stroke  · an unusual or allergic reaction to bevacizumab, hamster proteins, mouse proteins, other medicines, foods, dyes, or preservatives  · pregnant or trying to get pregnant  · breast-feeding  What should I watch for while using this medicine?  Your condition will be monitored carefully while you are receiving this medicine. You will need important blood work and urine testing done while you are taking this medicine.  During your treatment, let your health care professional know if you have any unusual symptoms, such as difficulty breathing.  This medicine may rarely cause 'gastrointestinal perforation' (holes in the stomach, intestines or colon), a serious side effect requiring surgery to repair.  This medicine should be started at least 28 days following major surgery and the site of the surgery should be totally healed. Check with your doctor before scheduling dental work or surgery while you are receiving this treatment. Talk to your doctor if you have recently had surgery or if you have a wound that has not healed.  Do not become pregnant while taking this medicine or for 6 months after stopping it. Women should inform their doctor if they wish to become pregnant or think they might be pregnant. There is a potential for serious side effects to an unborn child. Talk to your health care professional or pharmacist for more information. Do not breast-feed an infant while taking this medicine.  This medicine has caused ovarian failure in some women. This medicine may interfere with the ability to have a child. You should talk to  your doctor or health care professional if you are concerned about your fertility.  NOTE:This sheet is a summary. It may not cover all possible information. If you have questions about this medicine, talk to your doctor, pharmacist, or health care provider. Copyright© 2017 [employer]        Discharge Instructions for Chemotherapy  Your healthcare provider prescribed a type of medicine therapy for you called chemotherapy. Healthcare providers prescribe chemotherapy for many different types of illnesses, including cancer. There are many types of chemotherapy. This sheet provides general guidelines on how you can take care of yourself after your chemotherapy.  Mouth care  Dont be discouraged if you get mouth sores, even if you are following all your healthcare providers instructions. Many people get mouth sores as a side effect of chemotherapy. Heres what you can do to prevent mouth sores:  · Keep your mouth clean. Brush your teeth with a soft-bristle toothbrush after every meal.  · Ask if you should use a toothpaste with fluoride, or a mixture of 1 teaspoon of salt in 8-ounces of water to brush your teeth.   · Use an oral swab or special soft toothbrush if your gums bleed during regular brushing.  · Don't use dental floss if it causes your gums to bleed.  · Use any mouthwashes given to you as directed.  · If you cant tolerate regular methods, use salt and baking soda to clean your mouth. Mix 1 teaspoon of salt and 1 teaspoon of baking soda into an 8-ounce glass of warm water. Swish and spit.  · If you wear dentures, you may be told to wear them only when you eat, ask your healthcare provider. Clean dentures twice a day and soak in antimicrobial solution when you aren't wearing them. Rinse your mouth after each meal.   · Watch your mouth and tongue for white patches. This may be a sign of a type of yeast infection (thrush), a common side effect of chemotherapy. Be sure to tell your healthcare provider about  these patches. Medicine can be prescribed to treat it.  Other home care  Here's what else you can do:  · Try to exercise. Exercise keeps you strong and keeps your heart and lungs active. Walking and yoga are good types of exercise.   · Keep clean. During chemotherapy, your body cant fight infection very well. Take short baths or showers.  ¨ Wash your hands before you eat and after going to the bathroom.  ¨ Use moisturizing soap. Chemotherapy can make your skin dry.  ¨ Apply moisturizing lotion several times a day to help relieve dry skin.  ¨ Dont take very hot or very cold showers or baths.  · Dont be surprised if your chemotherapy causes slight burns to your skin--usually on the hands and feet. Some medicines used in high doses cause this to happen. Ask for a special cream to help relieve the burn and protect your skin.  · Avoid people who are sick with illnesses and diseases you could catch, such as colds, flu, measles, or chicken pox as well as people who have recently had vaccinations for these illnesses.   · Let your healthcare provider know if your throat is sore. You may have an infection that needs treatment.  · Remember, many patients feel sick and lose their appetites during treatment. Eat small meals several times a day to keep your strength up:  ¨ Choose bland foods with little taste or smell if you are reacting strongly to food.  ¨ Be sure to cook all food thoroughly. This kills bacteria and helps you avoid infection.  ¨ Eat foods that are soft. Soft foods are less likely to cause stomach irritation.  ¨ Try to eat a variety of foods for a well-balanced diet. Drink plenty of fluids and eat foods with fiber to avoid constipation.      When to call your healthcare provider  Call your healthcare provider right away if you have any of the following:  · Unexplained bleeding  · Trouble concentrating  · Ongoing fatigue  · Shortness of breath, wheezing, trouble breathing, or bad cough  · Rapid, irregular  heartbeat, or chest pain  · Dizziness, lightheadedness  · Constant feeling of being cold  · Hives or a cut or rash that swells, turns red, feels hot or painful, or begins to ooze  · Burning when you urinate  · Fever of 100.4°F (38°C) or higher, or chills   Date Last Reviewed: 5/1/2016  © 1841-7580 [employer]. [address] [place] [address], [place] [address]. All rights reserved. This information is not intended as a substitute for professional medical care. Always follow your healthcare professional's instructions.        Mouth Care During Chemotherapy     Brush gently with an extra soft toothbrush and mild toothpaste.     Mouth sores (stomatitis) and dry mouth are common side effects of chemotherapy and radiation therapy. These side effects occur because these treatments affect normal cells as well as cancer cells. Using the tips on this handout may help you feel better.   Remedies that help  · Rinse with 1/2 teaspoon baking soda and 1/4 teaspoon salt mixed in 1 cup of warm water. This helps keep your mouth free of germs.  · Use products that coat and protect the mouth and throat. Or use medications that coat and soothe mouth sores themselves.  · Numb your mouth and throat with special sprays or lozenges to make eating easier.  Prevent mouth sores  · Buy an extra soft toothbrush and mild toothpaste.  · Gently brush your teeth and gums.  · Have your dentist treat any dental problems before your therapy begins.  Moisten a dry mouth  · Drink plenty of water. Take frequent sips or suck on ice chips.  · Suck on sugar-free candy and lozenges. Chew sugar-free gum.  · Use products that moisten the mouth if your doctor prescribes them.  · Apply lip balm to help prevent dry lips.  · Avoid mouthwash that contains alcohol.  Choose foods less likely to irritate  Try foods that are:  · Soft and go down smoothly, such as a milkshake or food puréed with a   · Served cold or at room temperature  · Cooked until tender  and cut into small pieces  Avoid foods that are:  · Sharp or crunchy  · Hot, salty, or spicy  · Acidic, such as citrus juices  When to seek medical advice  · You develop mouth sores  · Mouth pain keeps you from eating or resting   Date Last Reviewed: 1/5/2016 © 2000-2017 [employer]. [address] [place] [address]. All rights reserved. This information is not intended as a substitute for professional medical care. Always follow your healthcare professional's instructions.        Nail Care During Chemotherapy     Wear gloves when you garden or do housework.      Minor nail problems are common side effects of chemotherapy. These side effects occur because the treatment affects normal cells as well as cancer cells. To manage these side effects, try the tips below.  Caution: Call your doctor if your cuticles become red and painful or show other changes.   Nail changes  Nail problems tend to be minor. In most cases, you can take care of them yourself. Dont be surprised if your nails become:  · Darkened  · Brittle  · Cracked  · Marked with vertical lines or bands  · Detached from the skin  What you can do  If nail problems occur, be patient. Damaged nails can be repaired only with new growth, which is slow. Fingernails grow about 1/8 inch a month. Toenails grow about 1/24 inch a month. Until your nails grow back:  · Keep them short and filed. Be careful when cutting around cuticles.  · Talk to your healthcare provider before using nail strengtheners or seeing a manicurist.  · Reduce the risk of infection. Wear gloves when washing dishes, gardening, or doing other work around the house. Be careful when you cut your nails and cuticles.  Date Last Reviewed: 1/5/2016 © 2000-2017 [employer]. [address] [place] [address]. All rights reserved. This information is not intended as a substitute for professional medical care. Always follow your healthcare professional's  instructions.        Nutrition During Chemotherapy     Drink plenty of liquids, such as water.     During chemotherapy, the energy provided by a healthy diet can help you rebuild normal cells. It can also help you keep up your strength and fight infection. As a result, you may feel better and be more able to cope with side effects. Ask your doctor about your nutrition needs.  Drink plenty of fluids  · Fluids help the body produce urine and decrease constipation. They help prevent kidney and bladder problems. They also help replace fluids lost from vomiting and diarrhea.  · Try water, unsweetened juices, and other flavored drinks without caffeine. They flush toxins from the body.  Get enough calories  · Calories are fuel. The body uses this fuel to perform all of its functions, including healing.  · Its OK to be lean, but be sure you are not underweight. If you are, try eating more calories.  · Eat calorie-dense foods such as avocados, peanut butter, eggs, and ice cream.  · If you need extra calories, add butter, gravy, and sauces to foods (if tolerated).  · If you don't need the extra calories, try to limit foods that are fried, greasy, or high in fat or added sugar.  Eat protein, fruits, and vegetables  · Protein builds muscle, bone, skin, and blood. It helps your body heal and fight infection. It also helps boost your energy level.  · Good protein choices include yogurt, eggs, chicken, lean meats, beans, and peanut butter.  · Fruits and vegetables are full of important vitamins, minerals, and fiber to help your body function properly.  · Try to eat a variety of vegetables, fruits, whole grains, and beans.  · Ask your doctor about instant protein powder or other supplements.  Eating right during treatment  Side effects may make it a little harder to eat well on some days. The following tips will help you continue to get the nutrition you need:  · Be open to new foods and recipes.  · Eat small portions often and  slowly.  · Have a healthy snack instead of a meal if you are not very hungry.  · Try eating in a new setting.  · Physical activity, such as walking, can help increase your appetite. Try to be active for at least 30 minutes each day.  · Boost your diet by getting the vitamins and minerals you need from fruits, vegetables, and whole grains.  · If you live alone and are not up to cooking, ask your healthcare provider about Meals on Wheels or other outreach programs.  · Sometimes, it is best to follow your appetitie. Eat when you are hungry, but when you ar enot, forcing yourself to eat can make you feel bad, nauseated, or even cause you to vomit.   Date Last Reviewed: 1/6/2016 © 2000-2017 [employer]. [address] [place] [address], [place] [address]. All rights reserved. This information is not intended as a substitute for professional medical care. Always follow your healthcare professional's instructions.        Oncology: Controlling Nausea and Vomiting     Taken before meals, medicines can help ease nausea.    Nausea and vomiting are common side effects of chemotherapy and radiation therapy. Side effects happen when treatment changes some normal cells as well as cancer cells. In this case, the cells lining your stomach and the part of your brain that controls vomiting are affected.  Nausea is feeling that you need to throw up. Vomiting is when you actually do throw up. This is when your body forces food that is in your stomach out through your mouth.  Nausea and vomiting are common. They can be caused by many things. These include:  · Stomach flu (gastroenteritis)  · Food poisoning  · Stomach pain (gastritis)  · Blockages in the digestive system  · Constipation  · Infection  · Anxiety and stress  They can also be caused by a head injury, an infection in the brain or inside the ear, or migraines. Other common causes of nausea and vomiting include:  · Brain tumor  · Brain bruise or injury  · Motion  sickness  · Alcohol, pain medicines such as morphine, and cancer medicines (chemotherapy)  · Certain medical treatments, such as radiation therapy  · Poisonous things (toxins) such as plants or liquids that are swallowed by accident  · Advanced types of cancer  · Movement problems (psychogenic problems)  Extra pressure in the fluid that surrounds the brain and spinal cord (elevated intracranial pressure)  Sometimes belly (abdominal) pain and cramps are experienced along with nausea and vomiting. The symptoms can be mild and go away by themselves. Other symptoms can be serious and must be treated.  When to seek medical advice  Nausea and vomiting can happen before, during, or after cancer treatments. But it can be controlled. Dont consider it a normal part of cancer and cancer treatment. If not managed, it can become serious. It can change the fluid and chemical balances in your body, and could even keep you from getting cancer treatment. Call your healthcare provider right away if any of the following occur:  · You have nausea or vomiting that lasts 24 hours or more  · You cant take your antiemetics, or they are not working  · You have trouble keeping fluids down  · You become dizzy, lightheaded, or confused  · You have very dark urine or you stop urinating  Talk to your healthcare provider about your treatment and the nausea and vomiting management plan that's best for you. Be sure you know how and when to use antiemetics and when to call your provider.   Medicines can help  Nausea or vomiting can often be prevented or controlled with medicines called antiemetics. Your provider may give you antiemetics before or after treatment if you are getting chemotherapy or other treatments that cause nausea or vomiting. You may have to try different medicines or different combinations of medicines to get relief. But in nearly all cases, nausea and vomiting can be relieved.  Eating tips  · If you have medicines to control  nausea, take them before meals as directed.  · Avoid fatty or greasy foods while nauseated.  · Eat small meals slowly throughout the day.  · Ask someone to sit with you while you eat to keep you from thinking about feeling nauseated.  · Eat foods at room temperature or colder to avoid strong smells.  · Eat dry foods, such as toast, crackers, or pretzels. Also eat cool, light foods, such as applesauce, and bland foods, such as oatmeal or skinned chicken.  · Try to keep taking in clear fluids in small sips, or as ice chips, gelatin, or ice pops.  Other ways to feel better  · Get a little fresh air. Take a short walk.  · Talk to a friend, listen to music, or watch TV.  · Take a few deep, slow breaths.  · Eat by candlelight or in surroundings that you find relaxing.  · Use a method to help you relax, such as guided imagery. Imagine yourself in a beautiful, restful scene. Or daydream about the place youd most like to be.  Date Last Reviewed: 12/1/2016  © 7017-2552 [employer]. [address] [place] [address], [place] [address]. All rights reserved. This information is not intended as a substitute for professional medical care. Always follow your healthcare professional's instructions.        Oncology: Controlling Diarrhea  A common side effect of cancer and cancer treatment is having loose, watery stools that you have more often than usual (diarrhea). There are many things that can cause diarrhea, such as:  · Chemotherapy  · Radiation therapy to the belly (abdomen) or pelvis  · Antibiotics  · Other medicines you take  · Infection  · Stress  Diarrhea can cause you to quickly become dehydrated and can change the balance of nutrients and minerals in your body. To help limit this problem, try the tips on this handout.     For breakfast, try eating toasted white bread and a banana.   Tips for controlling diarrhea  These steps can help you keep diarrhea from starting or getting worse:  · Limit the amount of fiber in your  diet. Avoid high-fiber foods such as whole-grain bread and brown rice. Instead, eat white bread and rice.  · Eat foods rich in potassium, such as bananas and oranges. This can help replace electrolytes lost due to diarrhea.  · Eat small, frequent meals.  · Drink plenty of fluids. Clear liquids, sports drinks, and flat light sodas such as ginger ale are best. They can help replace fluids lost due to diarrhea, and prevent dehydration.  · Avoid coffee, tea, and alcohol.  · Try not to eat foods that are fried, greasy, spicy, or sweet.  · Limit milk products and the amount of milk you drink.  Medicines can help  Ongoing diarrhea is not something you have to live with and it can become dangerous. Talk with your healthcare provider about your risk for diarrhea, what you can do to try to prevent it, and what you should do if it starts. You may be given medicine to stop diarrhea or help keep it from starting. Your healthcare provider may also suggest using an ointment to soothe irritation. Keeping the anal area clean with a mild soap or baby wipes helps as well. Dont take any over-the-counter product without asking your provider first.  When to see your healthcare provider  See your healthcare provider if any of the following occur:  · The diarrhea lasts more than 24 to 48 hours.  · There is blood in your stool or when you wipe.  · You have pain in your belly.  · You become lightheaded or dizzy.  · Your urine becomes very dark or you stop urinating.  · The medicine you were given to stop diarrhea is not working.   Date Last Reviewed: 12/1/2016  © 7938-5075 [employer]. [address] [place] [address], [place] [address]. All rights reserved. This information is not intended as a substitute for professional medical care. Always follow your healthcare professional's instructions.

## 2019-03-26 NOTE — PLAN OF CARE
Problem: Adult Inpatient Plan of Care  Goal: Plan of Care Review  Outcome: Ongoing (interventions implemented as appropriate)  Pt tolerated her infusions well, NAD, no new c/o voiced. Pt given an AVS with her appointment schedule, an after hours info sheet and a magnetic phone info card. All questions answered and advised to call MD with any other questions or concerns. Pt left the clinic via WC and was escorted by her .

## 2019-04-06 PROBLEM — K52.9 ENTEROCOLITIS: Status: ACTIVE | Noted: 2019-04-06

## 2019-04-06 PROBLEM — R94.31 ABNORMAL FINDING ON EKG: Status: ACTIVE | Noted: 2019-04-06

## 2019-04-06 PROBLEM — T45.1X5A CHEMOTHERAPY-INDUCED NEUTROPENIA: Status: ACTIVE | Noted: 2019-04-06

## 2019-04-06 PROBLEM — R10.11 RUQ PAIN: Status: ACTIVE | Noted: 2019-04-06

## 2019-04-06 PROBLEM — R53.1 WEAKNESS GENERALIZED: Status: ACTIVE | Noted: 2019-04-06

## 2019-04-06 PROBLEM — C71.9 GLIOBLASTOMA MULTIFORME OF BRAIN: Status: ACTIVE | Noted: 2019-04-06

## 2019-04-06 PROBLEM — D70.1 CHEMOTHERAPY-INDUCED NEUTROPENIA: Status: ACTIVE | Noted: 2019-04-06

## 2019-04-08 ENCOUNTER — DOCUMENTATION ONLY (OUTPATIENT)
Dept: INFUSION THERAPY | Facility: HOSPITAL | Age: 69
End: 2019-04-08

## 2019-04-08 NOTE — PROGRESS NOTES
[4/8/2019 1:17 PM]  Macy Bob  good afternoon looking at patients on schedule for tomorrow and dr parish seen tj [phone] in hospital making sure it is okay to remove her from our schedule tomorrow thanks       [4/8/2019 2:47 PM]  Sandra Dial:    Tj [id number] is still in hospital and want be out by tomorrow so cancel her appt.  Hospice is consulted to speak w family.  No decision has been made yet     [4/8/2019 2:48 PM]  Macy Bob  okay thanks

## 2019-04-10 ENCOUNTER — DOCUMENTATION ONLY (OUTPATIENT)
Dept: HEMATOLOGY/ONCOLOGY | Facility: CLINIC | Age: 69
End: 2019-04-10